# Patient Record
Sex: FEMALE | Race: WHITE | Employment: OTHER | URBAN - METROPOLITAN AREA
[De-identification: names, ages, dates, MRNs, and addresses within clinical notes are randomized per-mention and may not be internally consistent; named-entity substitution may affect disease eponyms.]

---

## 2017-01-01 ENCOUNTER — TELEPHONE (OUTPATIENT)
Dept: INTERNAL MEDICINE CLINIC | Facility: CLINIC | Age: 82
End: 2017-01-01

## 2017-01-01 ENCOUNTER — APPOINTMENT (OUTPATIENT)
Dept: GENERAL RADIOLOGY | Facility: HOSPITAL | Age: 82
DRG: 291 | End: 2017-01-01
Attending: EMERGENCY MEDICINE
Payer: MEDICARE

## 2017-01-01 ENCOUNTER — HOSPITAL ENCOUNTER (INPATIENT)
Facility: HOSPITAL | Age: 82
LOS: 3 days | Discharge: NURSING, REHAB OR LONG-TERM CARE | DRG: 640 | End: 2017-01-01
Attending: EMERGENCY MEDICINE | Admitting: HOSPITALIST
Payer: MEDICARE

## 2017-01-01 ENCOUNTER — OFFICE VISIT (OUTPATIENT)
Dept: INTERNAL MEDICINE CLINIC | Facility: CLINIC | Age: 82
End: 2017-01-01

## 2017-01-01 ENCOUNTER — SNF/IP PROF CHARGE ONLY (OUTPATIENT)
Dept: INTERNAL MEDICINE CLINIC | Facility: CLINIC | Age: 82
End: 2017-01-01

## 2017-01-01 ENCOUNTER — HOSPITAL ENCOUNTER (INPATIENT)
Facility: HOSPITAL | Age: 82
LOS: 1 days | DRG: 291 | End: 2017-01-01
Attending: EMERGENCY MEDICINE | Admitting: HOSPITALIST
Payer: MEDICARE

## 2017-01-01 ENCOUNTER — APPOINTMENT (OUTPATIENT)
Dept: GENERAL RADIOLOGY | Facility: HOSPITAL | Age: 82
DRG: 872 | End: 2017-01-01
Attending: EMERGENCY MEDICINE
Payer: MEDICARE

## 2017-01-01 ENCOUNTER — APPOINTMENT (OUTPATIENT)
Dept: GENERAL RADIOLOGY | Facility: HOSPITAL | Age: 82
DRG: 872 | End: 2017-01-01
Attending: HOSPITALIST
Payer: MEDICARE

## 2017-01-01 ENCOUNTER — HOSPITAL ENCOUNTER (INPATIENT)
Facility: HOSPITAL | Age: 82
LOS: 6 days | Discharge: NURSING, REHAB OR LONG-TERM CARE | DRG: 872 | End: 2017-01-01
Attending: EMERGENCY MEDICINE | Admitting: HOSPITALIST
Payer: MEDICARE

## 2017-01-01 ENCOUNTER — APPOINTMENT (OUTPATIENT)
Dept: PHYSICAL THERAPY | Facility: HOSPITAL | Age: 82
DRG: 872 | End: 2017-01-01
Attending: HOSPITALIST
Payer: MEDICARE

## 2017-01-01 ENCOUNTER — APPOINTMENT (OUTPATIENT)
Dept: GENERAL RADIOLOGY | Facility: HOSPITAL | Age: 82
DRG: 640 | End: 2017-01-01
Attending: EMERGENCY MEDICINE
Payer: MEDICARE

## 2017-01-01 ENCOUNTER — APPOINTMENT (OUTPATIENT)
Dept: GENERAL RADIOLOGY | Facility: HOSPITAL | Age: 82
DRG: 640 | End: 2017-01-01
Attending: NURSE PRACTITIONER
Payer: MEDICARE

## 2017-01-01 ENCOUNTER — SNF VISIT (OUTPATIENT)
Dept: INTERNAL MEDICINE CLINIC | Facility: CLINIC | Age: 82
End: 2017-01-01

## 2017-01-01 ENCOUNTER — LAB ENCOUNTER (OUTPATIENT)
Dept: LAB | Age: 82
End: 2017-01-01
Attending: INTERNAL MEDICINE
Payer: MEDICARE

## 2017-01-01 ENCOUNTER — SNF VISIT (OUTPATIENT)
Dept: INTERNAL MEDICINE CLINIC | Facility: SKILLED NURSING FACILITY | Age: 82
End: 2017-01-01

## 2017-01-01 ENCOUNTER — HOSPITAL ENCOUNTER (OUTPATIENT)
Dept: GENERAL RADIOLOGY | Age: 82
Discharge: HOME | End: 2017-01-01
Attending: INTERNAL MEDICINE | Admitting: INTERNAL MEDICINE
Payer: MEDICARE

## 2017-01-01 ENCOUNTER — APPOINTMENT (OUTPATIENT)
Dept: CV DIAGNOSTICS | Facility: HOSPITAL | Age: 82
DRG: 640 | End: 2017-01-01
Attending: INTERNAL MEDICINE
Payer: MEDICARE

## 2017-01-01 ENCOUNTER — APPOINTMENT (OUTPATIENT)
Dept: CT IMAGING | Facility: HOSPITAL | Age: 82
DRG: 872 | End: 2017-01-01
Attending: EMERGENCY MEDICINE
Payer: MEDICARE

## 2017-01-01 VITALS
OXYGEN SATURATION: 96 % | BODY MASS INDEX: 15 KG/M2 | SYSTOLIC BLOOD PRESSURE: 110 MMHG | DIASTOLIC BLOOD PRESSURE: 58 MMHG | HEIGHT: 59 IN | TEMPERATURE: 99 F | HEART RATE: 116 BPM | WEIGHT: 74.38 LBS | RESPIRATION RATE: 20 BRPM

## 2017-01-01 VITALS
DIASTOLIC BLOOD PRESSURE: 37 MMHG | BODY MASS INDEX: 16.92 KG/M2 | WEIGHT: 80.63 LBS | TEMPERATURE: 98 F | OXYGEN SATURATION: 90 % | HEART RATE: 62 BPM | HEIGHT: 58 IN | RESPIRATION RATE: 18 BRPM | SYSTOLIC BLOOD PRESSURE: 120 MMHG

## 2017-01-01 VITALS
BODY MASS INDEX: 15.59 KG/M2 | HEART RATE: 91 BPM | TEMPERATURE: 98 F | SYSTOLIC BLOOD PRESSURE: 100 MMHG | WEIGHT: 77.31 LBS | HEIGHT: 59 IN | OXYGEN SATURATION: 96 % | DIASTOLIC BLOOD PRESSURE: 44 MMHG | RESPIRATION RATE: 16 BRPM

## 2017-01-01 VITALS
TEMPERATURE: 98 F | DIASTOLIC BLOOD PRESSURE: 56 MMHG | HEART RATE: 58 BPM | BODY MASS INDEX: 14.84 KG/M2 | WEIGHT: 73.63 LBS | RESPIRATION RATE: 16 BRPM | SYSTOLIC BLOOD PRESSURE: 120 MMHG | HEIGHT: 59 IN | OXYGEN SATURATION: 97 %

## 2017-01-01 VITALS
HEIGHT: 59 IN | WEIGHT: 68.19 LBS | BODY MASS INDEX: 13.75 KG/M2 | OXYGEN SATURATION: 92 % | DIASTOLIC BLOOD PRESSURE: 40 MMHG | SYSTOLIC BLOOD PRESSURE: 110 MMHG | HEART RATE: 68 BPM | TEMPERATURE: 98 F

## 2017-01-01 VITALS
SYSTOLIC BLOOD PRESSURE: 118 MMHG | WEIGHT: 72 LBS | BODY MASS INDEX: 15 KG/M2 | HEART RATE: 68 BPM | DIASTOLIC BLOOD PRESSURE: 40 MMHG | TEMPERATURE: 98 F

## 2017-01-01 DIAGNOSIS — R63.4 WEIGHT LOSS: ICD-10-CM

## 2017-01-01 DIAGNOSIS — I48.0 PAROXYSMAL ATRIAL FIBRILLATION (HCC): ICD-10-CM

## 2017-01-01 DIAGNOSIS — R63.5 WEIGHT GAIN: ICD-10-CM

## 2017-01-01 DIAGNOSIS — G25.81 RESTLESS LEG SYNDROME: ICD-10-CM

## 2017-01-01 DIAGNOSIS — D64.9 ANEMIA, UNSPECIFIED: Primary | ICD-10-CM

## 2017-01-01 DIAGNOSIS — R77.8 ELEVATED TROPONIN: ICD-10-CM

## 2017-01-01 DIAGNOSIS — I35.9 AORTIC VALVE DISORDER: ICD-10-CM

## 2017-01-01 DIAGNOSIS — R53.1 WEAKNESS: ICD-10-CM

## 2017-01-01 DIAGNOSIS — R82.90 ABNORMAL URINALYSIS: ICD-10-CM

## 2017-01-01 DIAGNOSIS — R82.90 NONSPECIFIC FINDING ON EXAMINATION OF URINE: Primary | ICD-10-CM

## 2017-01-01 DIAGNOSIS — J44.9 CHRONIC OBSTRUCTIVE PULMONARY DISEASE, UNSPECIFIED COPD TYPE (HCC): ICD-10-CM

## 2017-01-01 DIAGNOSIS — Z95.2 S/P TAVR (TRANSCATHETER AORTIC VALVE REPLACEMENT): ICD-10-CM

## 2017-01-01 DIAGNOSIS — I10 ESSENTIAL HYPERTENSION: Primary | ICD-10-CM

## 2017-01-01 DIAGNOSIS — R53.1 WEAKNESS GENERALIZED: ICD-10-CM

## 2017-01-01 DIAGNOSIS — E86.0 DEHYDRATION: ICD-10-CM

## 2017-01-01 DIAGNOSIS — Z85.3 HISTORY OF LEFT BREAST CANCER: ICD-10-CM

## 2017-01-01 DIAGNOSIS — I50.32 CHRONIC DIASTOLIC CONGESTIVE HEART FAILURE (HCC): ICD-10-CM

## 2017-01-01 DIAGNOSIS — R06.02 SOB (SHORTNESS OF BREATH): ICD-10-CM

## 2017-01-01 DIAGNOSIS — Z91.81 AT RISK FOR FALLING: ICD-10-CM

## 2017-01-01 DIAGNOSIS — N39.0 URINARY TRACT INFECTION WITH HEMATURIA, SITE UNSPECIFIED: Primary | ICD-10-CM

## 2017-01-01 DIAGNOSIS — R60.0 BILATERAL EDEMA OF LOWER EXTREMITY: ICD-10-CM

## 2017-01-01 DIAGNOSIS — I48.0 PAROXYSMAL ATRIAL FIBRILLATION (HCC): Primary | ICD-10-CM

## 2017-01-01 DIAGNOSIS — I95.9 HYPOTENSION, UNSPECIFIED HYPOTENSION TYPE: ICD-10-CM

## 2017-01-01 DIAGNOSIS — R39.9 LOWER URINARY TRACT SYMPTOMS: ICD-10-CM

## 2017-01-01 DIAGNOSIS — E86.0 DEHYDRATION: Primary | ICD-10-CM

## 2017-01-01 DIAGNOSIS — R31.9 URINARY TRACT INFECTION WITH HEMATURIA, SITE UNSPECIFIED: Primary | ICD-10-CM

## 2017-01-01 DIAGNOSIS — I48.20 CHRONIC ATRIAL FIBRILLATION (HCC): ICD-10-CM

## 2017-01-01 DIAGNOSIS — R10.9 ABDOMINAL PAIN, UNSPECIFIED ABDOMINAL LOCATION: ICD-10-CM

## 2017-01-01 DIAGNOSIS — I25.119 ATHEROSCLEROSIS OF NATIVE CORONARY ARTERY OF NATIVE HEART WITH ANGINA PECTORIS (HCC): ICD-10-CM

## 2017-01-01 DIAGNOSIS — D64.9 ANEMIA, UNSPECIFIED: ICD-10-CM

## 2017-01-01 DIAGNOSIS — N13.30 HYDRONEPHROSIS, UNSPECIFIED HYDRONEPHROSIS TYPE: ICD-10-CM

## 2017-01-01 DIAGNOSIS — R10.84 GENERALIZED ABDOMINAL PAIN: ICD-10-CM

## 2017-01-01 DIAGNOSIS — R55 SYNCOPE AND COLLAPSE: Primary | ICD-10-CM

## 2017-01-01 DIAGNOSIS — D64.9 ANEMIA, UNSPECIFIED TYPE: ICD-10-CM

## 2017-01-01 DIAGNOSIS — R06.02 SHORTNESS OF BREATH: ICD-10-CM

## 2017-01-01 DIAGNOSIS — R19.5 HEME POSITIVE STOOL: ICD-10-CM

## 2017-01-01 DIAGNOSIS — R10.10 PAIN OF UPPER ABDOMEN: ICD-10-CM

## 2017-01-01 DIAGNOSIS — I25.10 CORONARY ARTERY DISEASE INVOLVING NATIVE CORONARY ARTERY OF NATIVE HEART WITHOUT ANGINA PECTORIS: ICD-10-CM

## 2017-01-01 DIAGNOSIS — R00.0 TACHYCARDIA WITH HEART RATE 141-160 BEATS PER MINUTE: ICD-10-CM

## 2017-01-01 DIAGNOSIS — Z85.3 HISTORY OF BREAST CANCER: ICD-10-CM

## 2017-01-01 DIAGNOSIS — I50.9 CHRONIC CONGESTIVE HEART FAILURE, UNSPECIFIED CONGESTIVE HEART FAILURE TYPE: ICD-10-CM

## 2017-01-01 DIAGNOSIS — I50.9 ACUTE ON CHRONIC CONGESTIVE HEART FAILURE, UNSPECIFIED CONGESTIVE HEART FAILURE TYPE: ICD-10-CM

## 2017-01-01 DIAGNOSIS — R53.1 GENERALIZED WEAKNESS: ICD-10-CM

## 2017-01-01 DIAGNOSIS — I10 ESSENTIAL HYPERTENSION: ICD-10-CM

## 2017-01-01 DIAGNOSIS — Z95.2 HEART VALVE REPLACED: ICD-10-CM

## 2017-01-01 DIAGNOSIS — I48.91 ATRIAL FIBRILLATION WITH RVR (HCC): ICD-10-CM

## 2017-01-01 DIAGNOSIS — R53.1 GENERAL WEAKNESS: ICD-10-CM

## 2017-01-01 DIAGNOSIS — R10.9 ABDOMINAL PAIN, UNSPECIFIED ABDOMINAL LOCATION: Primary | ICD-10-CM

## 2017-01-01 DIAGNOSIS — K59.00 CONSTIPATION, UNSPECIFIED CONSTIPATION TYPE: ICD-10-CM

## 2017-01-01 LAB
ALBUMIN SERPL BCP-MCNC: 2.7 G/DL (ref 3.5–4.8)
ALBUMIN SERPL BCP-MCNC: 3.4 G/DL (ref 3.5–4.8)
ALBUMIN/GLOB SERPL: 1 {RATIO} (ref 1–2)
ALP SERPL-CCNC: 37 U/L (ref 32–100)
ALP SERPL-CCNC: 65 U/L (ref 32–100)
ALT SERPL-CCNC: 7 U/L (ref 14–54)
ALT SERPL-CCNC: <5 U/L (ref 14–54)
ANION GAP SERPL CALC-SCNC: 10 MMOL/L (ref 0–18)
ANION GAP SERPL CALC-SCNC: 15 MMOL/L (ref 0–18)
ANION GAP SERPL CALC-SCNC: 3 MMOL/L (ref 0–18)
ANION GAP SERPL CALC-SCNC: 5 MMOL/L (ref 0–18)
ANION GAP SERPL CALC-SCNC: 5 MMOL/L (ref 0–18)
ANION GAP SERPL CALC-SCNC: 7 MMOL/L (ref 0–18)
ANION GAP SERPL CALC-SCNC: 8 MMOL/L (ref 0–18)
ANTIBODY SCREEN: NEGATIVE
APTT PPP: 29.6 SECONDS (ref 23.2–35.3)
AST SERPL-CCNC: 19 U/L (ref 15–41)
AST SERPL-CCNC: 24 U/L (ref 15–41)
BACTERIA UR QL AUTO: NEGATIVE /HPF
BASOPHILS # BLD: 0 K/UL (ref 0–0.2)
BASOPHILS # BLD: 0.1 K/UL (ref 0–0.2)
BASOPHILS NFR BLD: 0 %
BASOPHILS NFR BLD: 1 %
BILIRUB DIRECT SERPL-MCNC: 0.1 MG/DL (ref 0–0.2)
BILIRUB SERPL-MCNC: 0.6 MG/DL (ref 0.3–1.2)
BILIRUB SERPL-MCNC: 0.6 MG/DL (ref 0.3–1.2)
BILIRUB UR QL: NEGATIVE
BNP SERPL-MCNC: 539 PG/ML (ref 0–100)
BUN SERPL-MCNC: 14 MG/DL (ref 8–20)
BUN SERPL-MCNC: 18 MG/DL (ref 8–20)
BUN SERPL-MCNC: 20 MG/DL (ref 8–20)
BUN SERPL-MCNC: 22 MG/DL (ref 8–20)
BUN SERPL-MCNC: 27 MG/DL (ref 8–20)
BUN SERPL-MCNC: 30 MG/DL (ref 8–20)
BUN SERPL-MCNC: 31 MG/DL (ref 8–20)
BUN SERPL-MCNC: 36 MG/DL (ref 8–20)
BUN SERPL-MCNC: 37 MG/DL (ref 8–20)
BUN/CREAT SERPL: 18.7 (ref 10–20)
BUN/CREAT SERPL: 21.4 (ref 10–20)
BUN/CREAT SERPL: 27.9 (ref 10–20)
BUN/CREAT SERPL: 29.4 (ref 10–20)
BUN/CREAT SERPL: 31.6 (ref 10–20)
BUN/CREAT SERPL: 32.4 (ref 10–20)
BUN/CREAT SERPL: 33.3 (ref 10–20)
BUN/CREAT SERPL: 33.7 (ref 10–20)
BUN/CREAT SERPL: 40.8 (ref 10–20)
CALCIUM SERPL-MCNC: 7.8 MG/DL (ref 8.5–10.5)
CALCIUM SERPL-MCNC: 7.9 MG/DL (ref 8.5–10.5)
CALCIUM SERPL-MCNC: 8.1 MG/DL (ref 8.5–10.5)
CALCIUM SERPL-MCNC: 8.1 MG/DL (ref 8.5–10.5)
CALCIUM SERPL-MCNC: 8.2 MG/DL (ref 8.5–10.5)
CALCIUM SERPL-MCNC: 8.6 MG/DL (ref 8.5–10.5)
CALCIUM SERPL-MCNC: 8.7 MG/DL (ref 8.5–10.5)
CALCIUM SERPL-MCNC: 8.8 MG/DL (ref 8.5–10.5)
CALCIUM SERPL-MCNC: 9.1 MG/DL (ref 8.5–10.5)
CHLORIDE SERPL-SCNC: 101 MMOL/L (ref 95–110)
CHLORIDE SERPL-SCNC: 102 MMOL/L (ref 95–110)
CHLORIDE SERPL-SCNC: 102 MMOL/L (ref 95–110)
CHLORIDE SERPL-SCNC: 103 MMOL/L (ref 95–110)
CHLORIDE SERPL-SCNC: 104 MMOL/L (ref 95–110)
CHLORIDE SERPL-SCNC: 109 MMOL/L (ref 95–110)
CHLORIDE SERPL-SCNC: 109 MMOL/L (ref 95–110)
CHLORIDE SERPL-SCNC: 112 MMOL/L (ref 95–110)
CHLORIDE SERPL-SCNC: 116 MMOL/L (ref 95–110)
CHOLEST SERPL-MCNC: 137 MG/DL (ref 110–200)
CLARITY UR: CLEAR
CLARITY UR: CLEAR
CO2 SERPL-SCNC: 21 MMOL/L (ref 22–32)
CO2 SERPL-SCNC: 23 MMOL/L (ref 22–32)
CO2 SERPL-SCNC: 23 MMOL/L (ref 22–32)
CO2 SERPL-SCNC: 24 MMOL/L (ref 22–32)
CO2 SERPL-SCNC: 26 MMOL/L (ref 22–32)
CO2 SERPL-SCNC: 26 MMOL/L (ref 22–32)
CO2 SERPL-SCNC: 28 MMOL/L (ref 22–32)
COLOR UR: YELLOW
CREAT SERPL-MCNC: 0.68 MG/DL (ref 0.5–1.5)
CREAT SERPL-MCNC: 0.68 MG/DL (ref 0.5–1.5)
CREAT SERPL-MCNC: 0.75 MG/DL (ref 0.5–1.5)
CREAT SERPL-MCNC: 0.76 MG/DL (ref 0.5–1.5)
CREAT SERPL-MCNC: 0.81 MG/DL (ref 0.5–1.5)
CREAT SERPL-MCNC: 0.84 MG/DL (ref 0.5–1.5)
CREAT SERPL-MCNC: 0.89 MG/DL (ref 0.5–1.5)
CREAT SERPL-MCNC: 1.17 MG/DL (ref 0.5–1.5)
CREAT SERPL-MCNC: 1.29 MG/DL (ref 0.5–1.5)
EOSINOPHIL # BLD: 0 K/UL (ref 0–0.7)
EOSINOPHIL # BLD: 0.1 K/UL (ref 0–0.7)
EOSINOPHIL # BLD: 0.1 K/UL (ref 0–0.7)
EOSINOPHIL # BLD: 0.2 K/UL (ref 0–0.7)
EOSINOPHIL # BLD: 0.4 K/UL (ref 0–0.7)
EOSINOPHIL # BLD: 0.4 K/UL (ref 0–0.7)
EOSINOPHIL NFR BLD: 0 %
EOSINOPHIL NFR BLD: 1 %
EOSINOPHIL NFR BLD: 1 %
EOSINOPHIL NFR BLD: 2 %
EOSINOPHIL NFR BLD: 2 %
EOSINOPHIL NFR BLD: 3 %
EOSINOPHIL NFR BLD: 4 %
ERYTHROCYTE [DISTWIDTH] IN BLOOD BY AUTOMATED COUNT: 15.6 % (ref 11–15)
ERYTHROCYTE [DISTWIDTH] IN BLOOD BY AUTOMATED COUNT: 17.7 % (ref 11–15)
ERYTHROCYTE [DISTWIDTH] IN BLOOD BY AUTOMATED COUNT: 17.7 % (ref 11–15)
ERYTHROCYTE [DISTWIDTH] IN BLOOD BY AUTOMATED COUNT: 17.8 % (ref 11–15)
ERYTHROCYTE [DISTWIDTH] IN BLOOD BY AUTOMATED COUNT: 17.8 % (ref 11–15)
ERYTHROCYTE [DISTWIDTH] IN BLOOD BY AUTOMATED COUNT: 18 % (ref 11–15)
ERYTHROCYTE [DISTWIDTH] IN BLOOD BY AUTOMATED COUNT: 18.1 % (ref 11–15)
ERYTHROCYTE [DISTWIDTH] IN BLOOD BY AUTOMATED COUNT: 18.6 % (ref 11–15)
ERYTHROCYTE [DISTWIDTH] IN BLOOD BY AUTOMATED COUNT: 18.9 % (ref 11–15)
FERRITIN SERPL IA-MCNC: 505 NG/ML (ref 11–307)
GLOBULIN PLAS-MCNC: 3.3 G/DL (ref 2.5–3.7)
GLUCOSE BLDC GLUCOMTR-MCNC: 131 MG/DL (ref 70–99)
GLUCOSE SERPL-MCNC: 101 MG/DL (ref 70–99)
GLUCOSE SERPL-MCNC: 112 MG/DL (ref 70–99)
GLUCOSE SERPL-MCNC: 112 MG/DL (ref 70–99)
GLUCOSE SERPL-MCNC: 137 MG/DL (ref 70–99)
GLUCOSE SERPL-MCNC: 157 MG/DL (ref 70–99)
GLUCOSE SERPL-MCNC: 73 MG/DL (ref 70–99)
GLUCOSE SERPL-MCNC: 91 MG/DL (ref 70–99)
GLUCOSE SERPL-MCNC: 94 MG/DL (ref 70–99)
GLUCOSE SERPL-MCNC: 97 MG/DL (ref 70–99)
GLUCOSE UR-MCNC: NEGATIVE MG/DL
HCT VFR BLD AUTO: 23.6 % (ref 35–48)
HCT VFR BLD AUTO: 23.6 % (ref 35–48)
HCT VFR BLD AUTO: 24.5 % (ref 35–48)
HCT VFR BLD AUTO: 25.5 % (ref 35–48)
HCT VFR BLD AUTO: 26.3 % (ref 35–48)
HCT VFR BLD AUTO: 28.9 % (ref 35–48)
HCT VFR BLD AUTO: 32.1 % (ref 35–48)
HCT VFR BLD AUTO: 32.9 % (ref 35–48)
HCT VFR BLD AUTO: 33.3 % (ref 35–48)
HDLC SERPL-MCNC: 52 MG/DL
HGB BLD-MCNC: 10.5 G/DL (ref 12–16)
HGB BLD-MCNC: 10.8 G/DL (ref 12–16)
HGB BLD-MCNC: 11 G/DL (ref 12–16)
HGB BLD-MCNC: 7.7 G/DL (ref 12–16)
HGB BLD-MCNC: 7.8 G/DL (ref 12–16)
HGB BLD-MCNC: 8.1 G/DL (ref 12–16)
HGB BLD-MCNC: 8.3 G/DL (ref 12–16)
HGB BLD-MCNC: 8.7 G/DL (ref 12–16)
HGB BLD-MCNC: 9.3 G/DL (ref 12–16)
HGB UR QL STRIP.AUTO: NEGATIVE
HGB UR QL STRIP.AUTO: NEGATIVE
HYALINE CASTS #/AREA URNS AUTO: 1 /LPF
INR BLD: 1.4 (ref 0.9–1.2)
IRON SATN MFR SERPL: 15 % (ref 15–50)
IRON SATN MFR SERPL: 6 % (ref 15–50)
IRON SERPL-MCNC: 11 MCG/DL (ref 28–170)
IRON SERPL-MCNC: 36 MCG/DL (ref 28–170)
KETONES UR-MCNC: NEGATIVE MG/DL
KETONES UR-MCNC: NEGATIVE MG/DL
LACTATE SERPL-SCNC: 1.1 MMOL/L (ref 0.5–2.2)
LACTATE SERPL-SCNC: 1.5 MMOL/L (ref 0.5–2.2)
LACTATE SERPL-SCNC: 1.9 MMOL/L (ref 0.5–2.2)
LACTATE SERPL-SCNC: 2.2 MMOL/L (ref 0.5–2.2)
LACTATE SERPL-SCNC: 2.5 MMOL/L (ref 0.5–2.2)
LDLC SERPL CALC-MCNC: 73 MG/DL (ref 0–99)
LEUKOCYTE ESTERASE UR QL STRIP.AUTO: NEGATIVE
LEUKOCYTE ESTERASE UR QL STRIP.AUTO: NEGATIVE
LYMPHOCYTES # BLD: 0.7 K/UL (ref 1–4)
LYMPHOCYTES # BLD: 0.8 K/UL (ref 1–4)
LYMPHOCYTES # BLD: 0.9 K/UL (ref 1–4)
LYMPHOCYTES # BLD: 0.9 K/UL (ref 1–4)
LYMPHOCYTES # BLD: 1 K/UL (ref 1–4)
LYMPHOCYTES # BLD: 1.1 K/UL (ref 1–4)
LYMPHOCYTES # BLD: 1.5 K/UL (ref 1–4)
LYMPHOCYTES # BLD: 1.5 K/UL (ref 1–4)
LYMPHOCYTES # BLD: 2.6 K/UL (ref 1–4)
LYMPHOCYTES NFR BLD: 11 %
LYMPHOCYTES NFR BLD: 11 %
LYMPHOCYTES NFR BLD: 19 %
LYMPHOCYTES NFR BLD: 21 %
LYMPHOCYTES NFR BLD: 3 %
LYMPHOCYTES NFR BLD: 4 %
LYMPHOCYTES NFR BLD: 42 %
LYMPHOCYTES NFR BLD: 6 %
LYMPHOCYTES NFR BLD: 9 %
MAGNESIUM SERPL-MCNC: 1.8 MG/DL (ref 1.8–2.5)
MAGNESIUM SERPL-MCNC: 1.9 MG/DL (ref 1.8–2.5)
MAGNESIUM SERPL-MCNC: 2.1 MG/DL (ref 1.8–2.5)
MCH RBC QN AUTO: 25.9 PG (ref 27–32)
MCH RBC QN AUTO: 26 PG (ref 27–32)
MCH RBC QN AUTO: 26.2 PG (ref 27–32)
MCH RBC QN AUTO: 26.2 PG (ref 27–32)
MCH RBC QN AUTO: 26.5 PG (ref 27–32)
MCH RBC QN AUTO: 26.6 PG (ref 27–32)
MCH RBC QN AUTO: 26.7 PG (ref 27–32)
MCH RBC QN AUTO: 27.7 PG (ref 27–32)
MCH RBC QN AUTO: 28.6 PG (ref 27–32)
MCHC RBC AUTO-ENTMCNC: 32.3 G/DL (ref 32–37)
MCHC RBC AUTO-ENTMCNC: 32.5 G/DL (ref 32–37)
MCHC RBC AUTO-ENTMCNC: 32.6 G/DL (ref 32–37)
MCHC RBC AUTO-ENTMCNC: 32.7 G/DL (ref 32–37)
MCHC RBC AUTO-ENTMCNC: 32.9 G/DL (ref 32–37)
MCHC RBC AUTO-ENTMCNC: 33.1 G/DL (ref 32–37)
MCHC RBC AUTO-ENTMCNC: 33.1 G/DL (ref 32–37)
MCHC RBC AUTO-ENTMCNC: 33.3 G/DL (ref 32–37)
MCHC RBC AUTO-ENTMCNC: 33.3 G/DL (ref 32–37)
MCV RBC AUTO: 79.6 FL (ref 80–100)
MCV RBC AUTO: 80.1 FL (ref 80–100)
MCV RBC AUTO: 80.2 FL (ref 80–100)
MCV RBC AUTO: 80.2 FL (ref 80–100)
MCV RBC AUTO: 80.3 FL (ref 80–100)
MCV RBC AUTO: 80.4 FL (ref 80–100)
MCV RBC AUTO: 80.5 FL (ref 80–100)
MCV RBC AUTO: 84.4 FL (ref 80–100)
MCV RBC AUTO: 86.5 FL (ref 80–100)
MONOCYTES # BLD: 0.3 K/UL (ref 0–1)
MONOCYTES # BLD: 0.7 K/UL (ref 0–1)
MONOCYTES # BLD: 0.9 K/UL (ref 0–1)
MONOCYTES # BLD: 0.9 K/UL (ref 0–1)
MONOCYTES # BLD: 1 K/UL (ref 0–1)
MONOCYTES # BLD: 1.1 K/UL (ref 0–1)
MONOCYTES # BLD: 1.4 K/UL (ref 0–1)
MONOCYTES NFR BLD: 10 %
MONOCYTES NFR BLD: 11 %
MONOCYTES NFR BLD: 11 %
MONOCYTES NFR BLD: 12 %
MONOCYTES NFR BLD: 14 %
MONOCYTES NFR BLD: 5 %
MONOCYTES NFR BLD: 5 %
MONOCYTES NFR BLD: 6 %
MONOCYTES NFR BLD: 7 %
NEUTROPHILS # BLD AUTO: 13.5 K/UL (ref 1.8–7.7)
NEUTROPHILS # BLD AUTO: 16.8 K/UL (ref 1.8–7.7)
NEUTROPHILS # BLD AUTO: 20.2 K/UL (ref 1.8–7.7)
NEUTROPHILS # BLD AUTO: 3.1 K/UL (ref 1.8–7.7)
NEUTROPHILS # BLD AUTO: 4.8 K/UL (ref 1.8–7.7)
NEUTROPHILS # BLD AUTO: 5.1 K/UL (ref 1.8–7.7)
NEUTROPHILS # BLD AUTO: 7 K/UL (ref 1.8–7.7)
NEUTROPHILS # BLD AUTO: 7.6 K/UL (ref 1.8–7.7)
NEUTROPHILS # BLD AUTO: 7.7 K/UL (ref 1.8–7.7)
NEUTROPHILS NFR BLD: 50 %
NEUTROPHILS NFR BLD: 67 %
NEUTROPHILS NFR BLD: 68 %
NEUTROPHILS NFR BLD: 72 %
NEUTROPHILS NFR BLD: 73 %
NEUTROPHILS NFR BLD: 78 %
NEUTROPHILS NFR BLD: 87 %
NEUTROPHILS NFR BLD: 90 %
NEUTROPHILS NFR BLD: 92 %
NITRITE UR QL STRIP.AUTO: NEGATIVE
NONHDLC SERPL-MCNC: 85 MG/DL
OSMOLALITY UR CALC.SUM OF ELEC: 274 MOSM/KG (ref 275–295)
OSMOLALITY UR CALC.SUM OF ELEC: 282 MOSM/KG (ref 275–295)
OSMOLALITY UR CALC.SUM OF ELEC: 287 MOSM/KG (ref 275–295)
OSMOLALITY UR CALC.SUM OF ELEC: 290 MOSM/KG (ref 275–295)
OSMOLALITY UR CALC.SUM OF ELEC: 293 MOSM/KG (ref 275–295)
OSMOLALITY UR CALC.SUM OF ELEC: 294 MOSM/KG (ref 275–295)
OSMOLALITY UR CALC.SUM OF ELEC: 296 MOSM/KG (ref 275–295)
OSMOLALITY UR CALC.SUM OF ELEC: 296 MOSM/KG (ref 275–295)
OSMOLALITY UR CALC.SUM OF ELEC: 302 MOSM/KG (ref 275–295)
PH UR: 5 [PH] (ref 5–8)
PH UR: 6 [PH] (ref 5–8)
PH UR: 6 [PH] (ref 5–8)
PLATELET # BLD AUTO: 151 K/UL (ref 140–400)
PLATELET # BLD AUTO: 167 K/UL (ref 140–400)
PLATELET # BLD AUTO: 180 K/UL (ref 140–400)
PLATELET # BLD AUTO: 185 K/UL (ref 140–400)
PLATELET # BLD AUTO: 203 K/UL (ref 140–400)
PLATELET # BLD AUTO: 210 K/UL (ref 140–400)
PLATELET # BLD AUTO: 211 K/UL (ref 140–400)
PLATELET # BLD AUTO: 225 K/UL (ref 140–400)
PLATELET # BLD AUTO: 263 K/UL (ref 140–400)
PMV BLD AUTO: 8 FL (ref 7.4–10.3)
PMV BLD AUTO: 8.1 FL (ref 7.4–10.3)
PMV BLD AUTO: 8.2 FL (ref 7.4–10.3)
PMV BLD AUTO: 8.2 FL (ref 7.4–10.3)
PMV BLD AUTO: 8.3 FL (ref 7.4–10.3)
PMV BLD AUTO: 8.4 FL (ref 7.4–10.3)
PMV BLD AUTO: 8.5 FL (ref 7.4–10.3)
PMV BLD AUTO: 8.5 FL (ref 7.4–10.3)
PMV BLD AUTO: 9.1 FL (ref 7.4–10.3)
POTASSIUM SERPL-SCNC: 3.4 MMOL/L (ref 3.3–5.1)
POTASSIUM SERPL-SCNC: 3.5 MMOL/L (ref 3.3–5.1)
POTASSIUM SERPL-SCNC: 3.8 MMOL/L (ref 3.3–5.1)
POTASSIUM SERPL-SCNC: 3.9 MMOL/L (ref 3.3–5.1)
POTASSIUM SERPL-SCNC: 4 MMOL/L (ref 3.3–5.1)
POTASSIUM SERPL-SCNC: 4 MMOL/L (ref 3.3–5.1)
POTASSIUM SERPL-SCNC: 4.1 MMOL/L (ref 3.3–5.1)
POTASSIUM SERPL-SCNC: 4.3 MMOL/L (ref 3.3–5.1)
POTASSIUM SERPL-SCNC: 4.7 MMOL/L (ref 3.3–5.1)
POTASSIUM SERPL-SCNC: 5.1 MMOL/L (ref 3.3–5.1)
PROCALCITONIN SERPL-MCNC: 0.07 NG/ML (ref ?–0.11)
PROT SERPL-MCNC: 5.5 G/DL (ref 5.9–8.4)
PROT SERPL-MCNC: 6.7 G/DL (ref 5.9–8.4)
PROT UR-MCNC: NEGATIVE MG/DL
PROTHROMBIN TIME: 17.4 SECONDS (ref 11.8–14.5)
RBC # BLD AUTO: 2.94 M/UL (ref 3.7–5.4)
RBC # BLD AUTO: 2.94 M/UL (ref 3.7–5.4)
RBC # BLD AUTO: 3.07 M/UL (ref 3.7–5.4)
RBC # BLD AUTO: 3.18 M/UL (ref 3.7–5.4)
RBC # BLD AUTO: 3.27 M/UL (ref 3.7–5.4)
RBC # BLD AUTO: 3.6 M/UL (ref 3.7–5.4)
RBC # BLD AUTO: 3.8 M/UL (ref 3.7–5.4)
RBC # BLD AUTO: 3.85 M/UL (ref 3.7–5.4)
RBC # BLD AUTO: 4.1 M/UL (ref 3.7–5.4)
RBC #/AREA URNS AUTO: 1 /HPF
RBC #/AREA URNS AUTO: <1 /HPF
RH BLOOD TYPE: POSITIVE
SODIUM SERPL-SCNC: 132 MMOL/L (ref 136–144)
SODIUM SERPL-SCNC: 135 MMOL/L (ref 136–144)
SODIUM SERPL-SCNC: 137 MMOL/L (ref 136–144)
SODIUM SERPL-SCNC: 137 MMOL/L (ref 136–144)
SODIUM SERPL-SCNC: 138 MMOL/L (ref 136–144)
SODIUM SERPL-SCNC: 139 MMOL/L (ref 136–144)
SODIUM SERPL-SCNC: 140 MMOL/L (ref 136–144)
SP GR UR STRIP: 1.01 (ref 1–1.03)
SP GR UR STRIP: 1.02 (ref 1–1.03)
SP GR UR STRIP: 1.02 (ref 1–1.03)
T4 FREE SERPL-MCNC: 1.11 NG/DL (ref 0.58–1.64)
TIBC SERPL-MCNC: 197 MCG/DL (ref 228–428)
TIBC SERPL-MCNC: 242 MCG/DL (ref 228–428)
TRANSFERRIN SERPL-MCNC: 149 MG/DL (ref 192–382)
TRANSFERRIN SERPL-MCNC: 183 MG/DL (ref 192–382)
TRIGL SERPL-MCNC: 59 MG/DL (ref 1–149)
TROPONIN I SERPL-MCNC: 0.04 NG/ML (ref ?–0.03)
TROPONIN I SERPL-MCNC: 0.05 NG/ML (ref ?–0.03)
TSH SERPL-ACNC: 2.48 UIU/ML (ref 0.34–5.6)
UROBILINOGEN UR STRIP-ACNC: <2
VIT C UR-MCNC: 40 MG/DL
VIT C UR-MCNC: NEGATIVE MG/DL
VIT C UR-MCNC: NEGATIVE MG/DL
WBC # BLD AUTO: 10.5 K/UL (ref 4–11)
WBC # BLD AUTO: 15.5 K/UL (ref 4–11)
WBC # BLD AUTO: 18.6 K/UL (ref 4–11)
WBC # BLD AUTO: 21.9 K/UL (ref 4–11)
WBC # BLD AUTO: 6 K/UL (ref 4–11)
WBC # BLD AUTO: 7.2 K/UL (ref 4–11)
WBC # BLD AUTO: 7.5 K/UL (ref 4–11)
WBC # BLD AUTO: 9.6 K/UL (ref 4–11)
WBC # BLD AUTO: 9.8 K/UL (ref 4–11)
WBC #/AREA URNS AUTO: 18 /HPF
WBC #/AREA URNS AUTO: 2 /HPF

## 2017-01-01 PROCEDURE — G0463 HOSPITAL OUTPT CLINIC VISIT: HCPCS | Performed by: INTERNAL MEDICINE

## 2017-01-01 PROCEDURE — 99214 OFFICE O/P EST MOD 30 MIN: CPT | Performed by: INTERNAL MEDICINE

## 2017-01-01 PROCEDURE — 71010 XR CHEST AP PORTABLE  (CPT=71010): CPT

## 2017-01-01 PROCEDURE — 99239 HOSP IP/OBS DSCHRG MGMT >30: CPT | Performed by: HOSPITALIST

## 2017-01-01 PROCEDURE — 99215 OFFICE O/P EST HI 40 MIN: CPT | Performed by: INTERNAL MEDICINE

## 2017-01-01 PROCEDURE — 93306 TTE W/DOPPLER COMPLETE: CPT | Performed by: INTERNAL MEDICINE

## 2017-01-01 PROCEDURE — 99223 1ST HOSP IP/OBS HIGH 75: CPT | Performed by: HOSPITALIST

## 2017-01-01 PROCEDURE — 36415 COLL VENOUS BLD VENIPUNCTURE: CPT

## 2017-01-01 PROCEDURE — 99310 SBSQ NF CARE HIGH MDM 45: CPT | Performed by: CLINICAL NURSE SPECIALIST

## 2017-01-01 PROCEDURE — 84466 ASSAY OF TRANSFERRIN: CPT

## 2017-01-01 PROCEDURE — 84439 ASSAY OF FREE THYROXINE: CPT

## 2017-01-01 PROCEDURE — 99308 SBSQ NF CARE LOW MDM 20: CPT | Performed by: INTERNAL MEDICINE

## 2017-01-01 PROCEDURE — 71020 XR CHEST PA + LAT CHEST (CPT=71020): CPT | Performed by: NURSE PRACTITIONER

## 2017-01-01 PROCEDURE — 99233 SBSQ HOSP IP/OBS HIGH 50: CPT | Performed by: HOSPITALIST

## 2017-01-01 PROCEDURE — 71010 XR CHEST AP PORTABLE  (CPT=71010): CPT | Performed by: EMERGENCY MEDICINE

## 2017-01-01 PROCEDURE — 82728 ASSAY OF FERRITIN: CPT

## 2017-01-01 PROCEDURE — 80053 COMPREHEN METABOLIC PANEL: CPT

## 2017-01-01 PROCEDURE — 85025 COMPLETE CBC W/AUTO DIFF WBC: CPT

## 2017-01-01 PROCEDURE — 74176 CT ABD & PELVIS W/O CONTRAST: CPT

## 2017-01-01 PROCEDURE — 70450 CT HEAD/BRAIN W/O DYE: CPT

## 2017-01-01 PROCEDURE — 99231 SBSQ HOSP IP/OBS SF/LOW 25: CPT | Performed by: REGISTERED NURSE

## 2017-01-01 PROCEDURE — 99306 1ST NF CARE HIGH MDM 50: CPT | Performed by: INTERNAL MEDICINE

## 2017-01-01 PROCEDURE — 3E0F76Z INTRODUCTION OF NUTRITIONAL SUBSTANCE INTO RESPIRATORY TRACT, VIA NATURAL OR ARTIFICIAL OPENING: ICD-10-PCS | Performed by: HOSPITALIST

## 2017-01-01 PROCEDURE — 87086 URINE CULTURE/COLONY COUNT: CPT

## 2017-01-01 PROCEDURE — 84443 ASSAY THYROID STIM HORMONE: CPT

## 2017-01-01 PROCEDURE — 81001 URINALYSIS AUTO W/SCOPE: CPT

## 2017-01-01 PROCEDURE — 99309 SBSQ NF CARE MODERATE MDM 30: CPT | Performed by: NURSE PRACTITIONER

## 2017-01-01 PROCEDURE — 74020 XR ABDOMEN, OBSTRUCTIVE SERIES (CPT=74020): CPT | Performed by: INTERNAL MEDICINE

## 2017-01-01 PROCEDURE — 80048 BASIC METABOLIC PNL TOTAL CA: CPT

## 2017-01-01 PROCEDURE — 83540 ASSAY OF IRON: CPT

## 2017-01-01 RX ORDER — IPRATROPIUM BROMIDE AND ALBUTEROL SULFATE 2.5; .5 MG/3ML; MG/3ML
3 SOLUTION RESPIRATORY (INHALATION)
Status: DISCONTINUED | OUTPATIENT
Start: 2017-01-01 | End: 2017-01-01

## 2017-01-01 RX ORDER — MAGNESIUM OXIDE 400 MG (241.3 MG MAGNESIUM) TABLET
400 TABLET ONCE
Status: COMPLETED | OUTPATIENT
Start: 2017-01-01 | End: 2017-01-01

## 2017-01-01 RX ORDER — ASPIRIN 81 MG/1
81 TABLET, CHEWABLE ORAL DAILY
Status: DISCONTINUED | OUTPATIENT
Start: 2017-01-01 | End: 2017-01-01

## 2017-01-01 RX ORDER — POLYETHYLENE GLYCOL 3350 17 G/17G
17 POWDER, FOR SOLUTION ORAL DAILY PRN
Qty: 7 EACH | Refills: 0 | Status: SHIPPED | OUTPATIENT
Start: 2017-01-01 | End: 2017-01-01

## 2017-01-01 RX ORDER — 0.9 % SODIUM CHLORIDE 0.9 %
VIAL (ML) INJECTION
Status: COMPLETED
Start: 2017-01-01 | End: 2017-01-01

## 2017-01-01 RX ORDER — FLUTICASONE PROPIONATE 50 MCG
1 SPRAY, SUSPENSION (ML) NASAL DAILY
Status: DISCONTINUED | OUTPATIENT
Start: 2017-01-01 | End: 2017-01-01

## 2017-01-01 RX ORDER — FUROSEMIDE 40 MG/1
40 TABLET ORAL DAILY
Status: DISCONTINUED | OUTPATIENT
Start: 2017-01-01 | End: 2017-01-01

## 2017-01-01 RX ORDER — POTASSIUM CHLORIDE 20 MEQ/1
40 TABLET, EXTENDED RELEASE ORAL EVERY 4 HOURS
Status: COMPLETED | OUTPATIENT
Start: 2017-01-01 | End: 2017-01-01

## 2017-01-01 RX ORDER — ACETAMINOPHEN 325 MG/1
650 TABLET ORAL EVERY 6 HOURS PRN
Status: DISCONTINUED | OUTPATIENT
Start: 2017-01-01 | End: 2017-01-01

## 2017-01-01 RX ORDER — MORPHINE SULFATE 2 MG/ML
2 INJECTION, SOLUTION INTRAMUSCULAR; INTRAVENOUS
Status: DISCONTINUED | OUTPATIENT
Start: 2017-01-01 | End: 2017-01-01

## 2017-01-01 RX ORDER — POTASSIUM CHLORIDE 20 MEQ/1
40 TABLET, EXTENDED RELEASE ORAL ONCE
Status: COMPLETED | OUTPATIENT
Start: 2017-01-01 | End: 2017-01-01

## 2017-01-01 RX ORDER — IPRATROPIUM BROMIDE AND ALBUTEROL SULFATE 2.5; .5 MG/3ML; MG/3ML
3 SOLUTION RESPIRATORY (INHALATION)
COMMUNITY

## 2017-01-01 RX ORDER — PANTOPRAZOLE SODIUM 40 MG/1
40 TABLET, DELAYED RELEASE ORAL
Status: DISCONTINUED | OUTPATIENT
Start: 2017-01-01 | End: 2017-01-01

## 2017-01-01 RX ORDER — ONDANSETRON 2 MG/ML
4 INJECTION INTRAMUSCULAR; INTRAVENOUS EVERY 6 HOURS PRN
Status: DISCONTINUED | OUTPATIENT
Start: 2017-01-01 | End: 2017-01-01

## 2017-01-01 RX ORDER — SODIUM CHLORIDE 0.9 % (FLUSH) 0.9 %
SYRINGE (ML) INJECTION
Status: COMPLETED
Start: 2017-01-01 | End: 2017-01-01

## 2017-01-01 RX ORDER — MORPHINE SULFATE 2 MG/ML
1 INJECTION, SOLUTION INTRAMUSCULAR; INTRAVENOUS EVERY 2 HOUR PRN
Status: DISCONTINUED | OUTPATIENT
Start: 2017-01-01 | End: 2017-01-01

## 2017-01-01 RX ORDER — ASPIRIN 81 MG/1
81 TABLET, CHEWABLE ORAL
Status: DISCONTINUED | OUTPATIENT
Start: 2017-01-01 | End: 2017-01-01

## 2017-01-01 RX ORDER — MORPHINE SULFATE 4 MG/ML
4 INJECTION, SOLUTION INTRAMUSCULAR; INTRAVENOUS EVERY 2 HOUR PRN
Status: DISCONTINUED | OUTPATIENT
Start: 2017-01-01 | End: 2017-01-01

## 2017-01-01 RX ORDER — FUROSEMIDE 40 MG/1
40 TABLET ORAL DAILY
Refills: 0 | Status: SHIPPED | COMMUNITY
Start: 2017-01-01

## 2017-01-01 RX ORDER — FERROUS GLUCONATE 324(37.5)
1 TABLET ORAL 3 TIMES DAILY
Qty: 30 TABLET | Refills: 0 | Status: SHIPPED | OUTPATIENT
Start: 2017-01-01 | End: 2017-01-01 | Stop reason: ALTCHOICE

## 2017-01-01 RX ORDER — SODIUM CHLORIDE 9 MG/ML
125 INJECTION, SOLUTION INTRAVENOUS CONTINUOUS
Status: DISCONTINUED | OUTPATIENT
Start: 2017-01-01 | End: 2017-01-01

## 2017-01-01 RX ORDER — HEPARIN SODIUM 5000 [USP'U]/ML
5000 INJECTION, SOLUTION INTRAVENOUS; SUBCUTANEOUS EVERY 12 HOURS SCHEDULED
Status: DISCONTINUED | OUTPATIENT
Start: 2017-01-01 | End: 2017-01-01

## 2017-01-01 RX ORDER — ALBUTEROL SULFATE 2.5 MG/3ML
2.5 SOLUTION RESPIRATORY (INHALATION) EVERY 6 HOURS PRN
Status: DISCONTINUED | OUTPATIENT
Start: 2017-01-01 | End: 2017-01-01

## 2017-01-01 RX ORDER — SODIUM CHLORIDE 9 MG/ML
INJECTION, SOLUTION INTRAVENOUS
Status: COMPLETED
Start: 2017-01-01 | End: 2017-01-01

## 2017-01-01 RX ORDER — BISACODYL 10 MG
10 SUPPOSITORY, RECTAL RECTAL
Status: DISCONTINUED | OUTPATIENT
Start: 2017-01-01 | End: 2017-01-01

## 2017-01-01 RX ORDER — FUROSEMIDE 40 MG/1
TABLET ORAL
Qty: 102 TABLET | Refills: 1 | Status: SHIPPED | OUTPATIENT
Start: 2017-01-01 | End: 2017-01-01

## 2017-01-01 RX ORDER — LISINOPRIL 20 MG/1
20 TABLET ORAL DAILY
Qty: 90 TABLET | Refills: 3 | Status: SHIPPED | OUTPATIENT
Start: 2017-01-01 | End: 2017-01-01

## 2017-01-01 RX ORDER — SODIUM CHLORIDE 0.9 % (FLUSH) 0.9 %
SYRINGE (ML) INJECTION
Status: DISPENSED
Start: 2017-01-01 | End: 2017-01-01

## 2017-01-01 RX ORDER — SODIUM CHLORIDE 9 MG/ML
INJECTION, SOLUTION INTRAVENOUS CONTINUOUS
Status: DISCONTINUED | OUTPATIENT
Start: 2017-01-01 | End: 2017-01-01

## 2017-01-01 RX ORDER — DOCUSATE SODIUM 100 MG/1
100 CAPSULE, LIQUID FILLED ORAL 2 TIMES DAILY
Status: DISCONTINUED | OUTPATIENT
Start: 2017-01-01 | End: 2017-01-01

## 2017-01-01 RX ORDER — LISINOPRIL 20 MG/1
20 TABLET ORAL DAILY
Status: DISCONTINUED | OUTPATIENT
Start: 2017-01-01 | End: 2017-01-01

## 2017-01-01 RX ORDER — METOPROLOL SUCCINATE 25 MG/1
25 TABLET, EXTENDED RELEASE ORAL
Status: DISCONTINUED | OUTPATIENT
Start: 2017-01-01 | End: 2017-01-01

## 2017-01-01 RX ORDER — ARIPIPRAZOLE 15 MG/1
40 TABLET ORAL ONCE
Status: COMPLETED | OUTPATIENT
Start: 2017-01-01 | End: 2017-01-01

## 2017-01-01 RX ORDER — SODIUM CHLORIDE 0.9 % (FLUSH) 0.9 %
3 SYRINGE (ML) INJECTION AS NEEDED
Status: DISCONTINUED | OUTPATIENT
Start: 2017-01-01 | End: 2017-01-01

## 2017-01-01 RX ORDER — SODIUM PHOSPHATE, DIBASIC AND SODIUM PHOSPHATE, MONOBASIC 7; 19 G/133ML; G/133ML
1 ENEMA RECTAL ONCE AS NEEDED
Status: ACTIVE | OUTPATIENT
Start: 2017-01-01 | End: 2017-01-01

## 2017-01-01 RX ORDER — ONDANSETRON 2 MG/ML
INJECTION INTRAMUSCULAR; INTRAVENOUS
Status: COMPLETED
Start: 2017-01-01 | End: 2017-01-01

## 2017-01-01 RX ORDER — MORPHINE SULFATE 2 MG/ML
INJECTION, SOLUTION INTRAMUSCULAR; INTRAVENOUS
Status: DISCONTINUED
Start: 2017-01-01 | End: 2017-01-01

## 2017-01-01 RX ORDER — ECHINACEA PURPUREA EXTRACT 125 MG
1 TABLET ORAL
Status: DISCONTINUED | OUTPATIENT
Start: 2017-01-01 | End: 2017-01-01

## 2017-01-01 RX ORDER — POLYETHYLENE GLYCOL 3350 17 G/17G
17 POWDER, FOR SOLUTION ORAL DAILY PRN
Status: DISCONTINUED | OUTPATIENT
Start: 2017-01-01 | End: 2017-01-01

## 2017-01-01 RX ORDER — IPRATROPIUM BROMIDE AND ALBUTEROL SULFATE 2.5; .5 MG/3ML; MG/3ML
3 SOLUTION RESPIRATORY (INHALATION) EVERY 6 HOURS PRN
COMMUNITY

## 2017-01-01 RX ORDER — BISACODYL 10 MG
10 SUPPOSITORY, RECTAL RECTAL
Refills: 0 | Status: SHIPPED | COMMUNITY
Start: 2017-01-01

## 2017-01-01 RX ORDER — CHLORAL HYDRATE 500 MG
1000 CAPSULE ORAL DAILY
Status: DISCONTINUED | OUTPATIENT
Start: 2017-01-01 | End: 2017-01-01

## 2017-01-01 RX ORDER — CEFADROXIL 500 MG/1
500 CAPSULE ORAL 2 TIMES DAILY
Qty: 6 CAPSULE | Refills: 0 | COMMUNITY
Start: 2017-01-01 | End: 2017-01-01 | Stop reason: ALTCHOICE

## 2017-01-01 RX ORDER — BIOTIN 10000 MCG
1 CAPSULE ORAL DAILY
COMMUNITY

## 2017-01-01 RX ORDER — FUROSEMIDE 20 MG/1
20 TABLET ORAL DAILY
Status: DISCONTINUED | OUTPATIENT
Start: 2017-01-01 | End: 2017-01-01

## 2017-01-01 RX ORDER — ONDANSETRON 2 MG/ML
4 INJECTION INTRAMUSCULAR; INTRAVENOUS ONCE
Status: COMPLETED | OUTPATIENT
Start: 2017-01-01 | End: 2017-01-01

## 2017-01-01 RX ORDER — FUROSEMIDE 10 MG/ML
20 INJECTION INTRAMUSCULAR; INTRAVENOUS ONCE
Status: COMPLETED | OUTPATIENT
Start: 2017-01-01 | End: 2017-01-01

## 2017-01-01 RX ORDER — METOPROLOL SUCCINATE 25 MG/1
25 TABLET, EXTENDED RELEASE ORAL
Refills: 0 | Status: SHIPPED | COMMUNITY
Start: 2017-01-01

## 2017-01-01 RX ORDER — ALBUTEROL SULFATE 90 UG/1
1 AEROSOL, METERED RESPIRATORY (INHALATION) EVERY 4 HOURS PRN
Status: DISCONTINUED | OUTPATIENT
Start: 2017-01-01 | End: 2017-01-01

## 2017-01-01 RX ORDER — ALBUTEROL SULFATE 90 UG/1
2 AEROSOL, METERED RESPIRATORY (INHALATION) EVERY 6 HOURS PRN
Status: DISCONTINUED | OUTPATIENT
Start: 2017-01-01 | End: 2017-01-01

## 2017-01-01 RX ORDER — SODIUM CHLORIDE 9 MG/ML
1000 INJECTION, SOLUTION INTRAVENOUS ONCE
Status: COMPLETED | OUTPATIENT
Start: 2017-01-01 | End: 2017-01-01

## 2017-01-01 RX ORDER — FAMOTIDINE 10 MG/ML
20 INJECTION, SOLUTION INTRAVENOUS DAILY
Status: DISCONTINUED | OUTPATIENT
Start: 2017-01-01 | End: 2017-01-01

## 2017-01-01 RX ORDER — MELATONIN
325
Status: DISCONTINUED | OUTPATIENT
Start: 2017-01-01 | End: 2017-01-01

## 2017-01-01 RX ORDER — MELATONIN
325
COMMUNITY

## 2017-01-01 RX ORDER — MORPHINE SULFATE 2 MG/ML
2 INJECTION, SOLUTION INTRAMUSCULAR; INTRAVENOUS EVERY 2 HOUR PRN
Status: DISCONTINUED | OUTPATIENT
Start: 2017-01-01 | End: 2017-01-01

## 2017-01-03 PROBLEM — R77.8 ELEVATED TROPONIN: Status: ACTIVE | Noted: 2017-01-01

## 2017-01-03 PROBLEM — R19.5 HEME POSITIVE STOOL: Status: ACTIVE | Noted: 2017-01-01

## 2017-01-03 PROBLEM — R55 SYNCOPE AND COLLAPSE: Status: ACTIVE | Noted: 2017-01-01

## 2017-01-03 PROBLEM — R79.89 ELEVATED TROPONIN: Status: ACTIVE | Noted: 2017-01-01

## 2017-01-03 PROBLEM — I95.9 HYPOTENSION, UNSPECIFIED HYPOTENSION TYPE: Status: ACTIVE | Noted: 2017-01-01

## 2017-01-03 NOTE — ED INITIAL ASSESSMENT (HPI)
Reported fall at home with syncopal episode. Reports head pain to back of head. Does not remember hitting head. Also reports blood in stool and abd pressure.  BP 88/40 by EMS

## 2017-01-04 NOTE — PLAN OF CARE
DISCHARGE PLANNING    • Discharge to home or other facility with appropriate resources Progressing    PLAN TO RETURN HOME    PAIN - ADULT    • Verbalizes/displays adequate comfort level or patient's stated pain goal Progressing    MEDICATE PT PRN    Guerline

## 2017-01-04 NOTE — CONSULTS
[facility]    Report of Consultation    [address] [facility] Patient Status:  Inpatient    10/2/1921 MRN [id number]   Location [facility] 2W/SW Attending Janel Bowman MD   Hosp Day # 1 PCP Laurita Roland MD     Date of Admission brain   • History of asthma    • Gallstones    • Hepatic cyst    • TIA (transient ischemic attack) 2000   • Restless leg syndrome    • Hyperlipidemia    • Carotid stenosis 2006   • Aortic stenosis    • Carotid artery disease (HCC)    • Hernia, abdominal History  Family History   Problem Relation Age of Onset   • Other[other] [OTHER] Mother      influenza- cause of death   • Other[other] [OTHER] Brother      passed away of \"mastoid\"   • Sharon Lunch Sister 15      frail   • Diabetes Neg    • by mouth daily.      CARBIDOPA-LEVODOPA  MG Oral Tab TAKE ONE TABLET BY MOUTH ONE TO THREE TIMES A DAY FOR RESTLESS LEG SYNDROME   furosemide (LASIX) 40 MG Oral Tab Take 1 tablet every day with an extra half tablet Monday Wednesday and Friday   Omega- tenderness  MUSCULOSKELETAL: no calf tenderness  PSYCH: normal affect  NUT: well nourished appearing      Results:     Laboratory Data:    Lab Results  Component Value Date   WBC 21.9* 01/04/2017   HGB 9.3* 01/04/2017   HCT 28.9* 01/04/2017    01/04 the globes/lens surgeries. Correlate with procedure history. OTHER: There is calcific atherosclerosis within the cavernous segments of the internal carotid arteries and in the vertebrobasilar system.   Dictated by (CST): Mitch Schulz MD on 1/03/2017 at 25 otherwise not well characterized due to lack soft tissue and intravenous contrast.  Left kidney is unremarkable there is also not well seen for the same reasons. AORTA/VASCULAR:   There is calcific aortic atherosclerosis, without aneurysm.  RETROPERITONEUM: intrahepatic pneumobilia and chronic intra and extrahepatic biliary ductal dilatation. There has been prior cholecystectomy. 4.  Prior bowel resection with reanastomosis in the right lower quadrant without obvious obstruction or inflammation.  5.  Cardiome [facility], X CHEST PORTABLE, 10/27/2015, 13:08. [facility], X CHEST PA LAT ROUTINE, 4/06/2015, 18:18. [facility], XR CHEST PA +  LAT CHEST (CPT=71020), 12/01/2016, 15:37.   [facility], X LINDSAY and with Dr. Suha Franco today. No further cardiac evaluation planned. Evaluation and treatment of sepsis in progress. Although she had Hemoccult-positive stools in the emergency room, her hemoglobin is unchanged from her baseline.   At the present time we have

## 2017-01-04 NOTE — CM/SW NOTE
+BPCI. Patient is a BPCI readmitted previously enrolled under  (arrthymia) on 12/20/16 with 76 days left in the BPCI episode which will end on 3/21/17.   51 Kendra Zuñiga [id number]

## 2017-01-04 NOTE — CONSULTS
[facility]    PATIENT'S NAME: Julio Justice   ATTENDING PHYSICIAN: Bartolo Lux MD   CONSULTING PHYSICIAN: Nuha Faye MD   PATIENT ACCOUNT#:   [id number]    LOCATION:  [place] [address]  MEDICAL RECORD #:   [id number]       DATE OF Iodine. SOCIAL HISTORY:  She is a nonsmoker. FAMILY HISTORY:  Noncontributory. REVIEW OF SYSTEMS:  GENERAL:  She complains of weakness. EYES:  Denies recent vision loss or changes. ENT:  She denies recent hearing loss or changes.   CARDIOVASCULAR ECG shows atrial fibrillation. ASSESSMENT AND PLAN:  A 77-year-old female who fell. 1.   Fall. This is most likely a combination of her abdominal pain and hypovolemia. Recommend hydration and management by the admitting physician.   She also has an

## 2017-01-04 NOTE — ED NOTES
Pt brought in from home via EMS for c/o fall. Pt states hitting her and +LOC. Per EMS, pt found on the toilet when EMS arrived on scene. Pt c/o intermittent head pain and lower ABD pain. On blood thinners at home.

## 2017-01-04 NOTE — ED PROVIDER NOTES
Patient Seen in: [facility] AND [facility] Emergency Department    History   Patient presents with:  Fall (musculoskeletal, neurologic)  Syncope (cardiovascular, neurologic)  Bleeding (hematologic)    Stated Complaint: fal, syncope, rectal bleed,     HPI    79 yo • Hypotropia 2008   • Subconjunctival hemorrhage 2012   • Stented coronary artery    • SIADH (syndrome of inappropriate ADH production) ([facility] [address]    • Breast cancer, left breast (HCC)    • Status post coronary artery stent placement      Patient had left kathleen OIL) 1000 MG Oral Capsule Delayed Release,  One capsule daily   Multiple Vitamin (STRESS FORMULA 500/BIOTIN) Oral Tab,  Take 1 tablet by mouth. acetaminophen (TYLENOL) 325 MG Oral Tab,  Take 650 mg by mouth every 6 (six) hours as needed for Pain.    BABY deformity. Neurological: Alert. CN II-XII grossly intact. BUE/BLE proximally and distally with 5/5 stregnth. Skin: Skin is warm. No cutaneous manifestations of trauma. Psychiatric: Cooperative. Nursing note and vitals reviewed.         ED Course     Lab -----------         ------                     BLOOD TYPE, ABO AND RH A[[id number]]                                                    ANTIBODY [facility][[id number]]                                                               Please view results for these dario vertebrobasilar system. Dictated by (CST): Iwona Vasquez MD on 1/03/2017 at 18:45     Approved by (CST): Iwona Vasquez MD on 1/03/2017 at 18:46          1/3/2017  CONCLUSION:  1. No acute intracranial process.   There is chronic age advanced cerebral and ce is calcific aortic atherosclerosis, without aneurysm. RETROPERITONEUM: No mass or enlarged adenopathy.   BOWEL/MESENTERY:  There has been prior bowel resection with reanastomosis in the right lower quadrant no convincing evidence of small or large bowel obs quadrant without obvious obstruction or inflammation. 5.  Cardiomegaly. Aortic valve prosthesis. Mitral annular calcifications. 6.  The visualized lung bases have airway changes compatible with bronchitis.  7.  Chronic mild wedge compression deformity at (CPT=71020), 12/01/2016, 15:37. [facility], X CHEST PORTABLE, 9/25/2016, 5:54. [facility], X CHEST PORTABLE, 4/12/2016, 23:58. INDICATIONS: Heart palpitations and tachycardia today.  History of paroxysmal atrial fibrillati Evaluation for syncope while attempting to stand with secondary BHT and c/o dark stool - GEOVANY without gross blood/melena though guiaic positive - T&S sent, PPI intiiated.  Labs notable for minimally elevated troponin with unchanged/nonacute EKG - given g

## 2017-01-04 NOTE — H&P
[facility]    PATIENT'S NAME: Kesha Thurman   ATTENDING PHYSICIAN: Louis Tang MD   PATIENT ACCOUNT#:   [de-identified]    LOCATION:  [place] [address]  MEDICAL RECORD #:   [id number]       YOB: 1921  ADMISSION DATE:       01/ main coronary artery disease, status post PCI and stent. Chronically elevated troponin. Restless leg syndrome. Hypertension. Large ventral abdominal hernia. History of gallstone pancreatitis, dyslipidemia, bilateral carotid stenosis of 50%.     PAST GIVENS intact. Last echocardiogram done December 21 showed normal ejection fraction, mild stenosis of the aortic valve with a gradient of 14, mitral valve normal thickness, mild to moderate regurgitation. ASSESSMENT:    1.    Combination of syncopal episode

## 2017-01-04 NOTE — HISTORICAL OFFICE NOTE
Tacho Garcia  : 10/02/1921  ACCOUNT:  [id number]  [phone]  PCP: Dr. Edith Winston     TODAY'S DATE: 10/22/2014  DICTATED BY:  Vanessa Fuentes M.D. ]    CHIEF COMPLAINT: [Followup of .  CAD, of native vessels, Followup of Benign Essential Hypertensio mastectomy. GI: soft, nontender. MS: adequate gait for exercise testing/testing. EXT: no clubbing or cyanosis. SKIN: no rashes, lesions, ulcers and left lower chest 2cm raised sebaceous intact cyst with pale pink edges.   NEURO/PSYCH: alert, oriented to ti 0.31MG/3  as needed                                06/01/11 Protonix              40MG      1 daily                                  06/02/10 Zocor                 20MG      1 daily at bedtime                       06/02/10 Multivitamins

## 2017-01-05 NOTE — OCCUPATIONAL THERAPY NOTE
OCCUPATIONAL THERAPY EVALUATION - INPATIENT     Room Number: 525/525-A  Evaluation Date: 1/5/2017  Type of Evaluation: Initial  Presenting Problem:  (Fall)    Physician Order: IP Consult to Occupational Therapy  Reason for Therapy: ADL/IADL Dysfunction and Hepatic cyst    • TIA (transient ischemic attack) 2000   • Restless leg syndrome    • Hyperlipidemia    • Carotid stenosis 2006   • Aortic stenosis    • Carotid artery disease (HCC)    • Hernia, abdominal      hernia repair   • Cholelithiasis    • Small guru Layout: One level  Lives With: Alone           Other Equipment:  (Rollator )          Drives: No       Stairs in Home: 0  Use of Assistive Device(s): Patient lives at [address] [place] [address].     Prior Level of Turner:  Patient was independent with all se Extremity Dressing: min a       Education Provided: Educated patient in role of OT and POC   Patient End of Session: Up in chair;Needs met;Call light within reach;RN aware of session/findings; All patient questions and concerns addressed      OT Goals     P

## 2017-01-05 NOTE — PLAN OF CARE
DISCHARGE PLANNING    • Discharge to home or other facility with appropriate resources Progressing        PAIN - ADULT    • Verbalizes/displays adequate comfort level or patient's stated pain goal Progressing        SAFETY ADULT - FALL    • Free from fall

## 2017-01-05 NOTE — PROGRESS NOTES
[place] [facility]    Progress Note    [address] [facility] Patient Status:  Inpatient    10/2/1921 MRN [id number]   Location [facility] 5SW/SE Attending Vasyl Noguera MD   Hosp Day # 2 PCP Riaz Sahni MD       Subjective:    Chasidy Bennett CREATSERUM 0.81 01/05/2017   BUN 27* 01/05/2017    01/05/2017   K 4.0 01/05/2017   K 4.0 01/05/2017   * 01/05/2017   CO2 21* 01/05/2017   * 01/05/2017   CA 8.7 01/05/2017   ALB 2.7* 01/05/2017   ALKPHO 37 01/05/2017   BILT 0.6 01/05/20 CHF. 2. The rest of the findings are stable. 3. Cardiomegaly. 4. Postop changes. 5. Atherosclerosis. 6. Demineralization. 7. Scoliosis. 8. Osteoarthritis. 9. Right mastectomy. Xr Chest Ap Portable  (cpt=71010)    1/3/2017  CONCLUSION:  1.   Small bi

## 2017-01-05 NOTE — PROGRESS NOTES
[facility]    Progress Note    [address] [facility] Patient Status:  Inpatient    10/2/1921 MRN [id number]   Location [facility] 5SW/SE Attending Andres Jennings MD   Hosp Day # 1 PCP Bessie Vance MD       Subjective:    Rossi Flowers 09/22/2015   ALKPHO 68 01/19/2013   BILT 0.7 09/22/2015   TP 6.5 09/22/2015   AST 21 09/22/2015   ALT 10* 09/22/2015   PTT 29.6 01/03/2017   INR 1.4* 01/03/2017   PT 16.6* 01/15/2013   T4F 1.17 07/05/2016   TSH 3.20 07/05/2016   ESRML 45* 01/16/2013   BNP Interpretation  -------------------------- Sinus Bradycardia -consider anterolateral injury.  ABNORMAL When compared with ECG of 12/29/2016 12:09:52 No significant changes have occurred Electronically signed on 01/03/2017 at 17:34 by Beka Artis

## 2017-01-05 NOTE — PROGRESS NOTES
[facility] Progress Note      Hien Peter Sharmabraeden Patient Status:  Inpatient    10/2/1921 MRN [id number]   Location [facility] 5SW/SE Attending Karen Armenta MD   Hosp Day # 2 PCP Shanique Ryder MD rhythm, systolic murmur  Abd: Abdomen soft, nontender, nondistended, no organomegaly, bowel sounds present  Ext:  no clubbing, no cyanosis,trace edema  Neuro: no focal deficits  Skin: no rashes or lesions, bruising present with intact skin    Scheduled Med assessment of the solid organs. 2.  There is nonspecific right hydronephrosis which is grossly unchanged since prior exam. 3.  Chronic intrahepatic pneumobilia and chronic intra and extrahepatic biliary ductal dilatation.   There has been prior cholecystect

## 2017-01-05 NOTE — PLAN OF CARE
PAIN - ADULT    • Verbalizes/displays adequate comfort level or patient's stated pain goal Progressing        Patient/Family Goals    • Patient/Family Long Term Goal Progressing    • Patient/Family Short Term Goal Progressing        SAFETY ADULT - FALL

## 2017-01-06 NOTE — PROGRESS NOTES
I spoke with Dr. Tariq Sarabia who spoke with the patient's son who does not want any aggressive interventions performed including endoscopy to evaluate her Hemoccult-positive stools. Her hemoglobin has drifted downwards a bit and we will follow this.

## 2017-01-06 NOTE — CONSULTS
INFECTIOUS DISEASE CONSULT NOTE    Antonia Underwood Patient Status:  Inpatient    10/2/1921 MRN [id number]   Location [facility] 5SW/SE Attending Ronold Castleman, MD   Hosp Day # 2 PCP Da • Hyperlipidemia    • Carotid stenosis 2006   • Aortic stenosis    • Carotid artery disease (HCC)    • Hernia, abdominal      hernia repair   • Cholelithiasis    • Small bowel obstruction ([facility] [address]      small bowel resection  2012   • Bowel dysfunction 2012 Veronica Chahal Sister 15      frail   • Diabetes Neg    • Glaucoma Neg    • Macular degeneration Neg       reports that she has never smoked. She does not have any smokeless tobacco history on file. She reports that she drinks alcohol.  She report source Oral, resp. rate 18, height 4' 10\" (1.473 m), weight 81 lb 11.2 oz (37.059 kg), SpO2 92 %, not currently breastfeeding. General: Alert, oriented, NAD, thin female  HEENT: Moist mucous membranes. Neck: No lymphadenopathy. Supple.   Respiratory: blood in the stool with limited exam with nonspecific right hydronephrosis that is unchanged, chronic intrahepatic pneumobilia and chronic intra-and extrahepatic biliary dilation status post cholecystectomy, previous bowel resection with reanastomosis, aor

## 2017-01-06 NOTE — PHYSICAL THERAPY NOTE
PHYSICAL THERAPY EVALUATION - INPATIENT     Room Number: 525/525-A  Evaluation Date: 1/6/2017  Type of Evaluation: Initial  Physician Order: PT Eval and Treat    Presenting Problem: syncope  Reason for Therapy: Mobility Dysfunction and Discharge Planni artery stent placement      Patient had left main stent placed at the time of her TAVR   • Pneumonia      2015       Past Surgical History      Past Surgical History    VENTRAL HERNIA REPAIR      MASTECTOMY RIGHT Right     HYSTERECTOMY      BOWEL RESECTION and with activity 95%  O2 Device: Nasal cannula  Liters of O2:  3  Heart Rate: at rest 60 and with activity 70    AM-PAC '6-Clicks' INPATIENT SHORT FORM - BASIC MOBILITY  How much difficulty does the patient currently have. ..  -   Turning over in bed (incl training;Transfer training; Endurance  Rehab Potential : Good  Frequency (Obs): 3x/week         CURRENT GOALS    Goal #1 Patient is able to demonstrate supine - sit EOB @ level: independent     Goal #2 Patient is able to demonstrate transfers Sit to/from [facility]

## 2017-01-06 NOTE — DIETARY NOTE
ADULT NUTRITION INITIAL ASSESSMENT    Pt is at moderate nutrition risk. Pt meets malnutrition criteria.       CRITERIA FOR MALNUTRITION DIAGNOSIS:  Criteria for non-severe malnutrition diagnosis: chronic illness related to energy intake less than75% for gr cancer, HTN, Gallstones, TIA, Hyperlipidemia,SBO with partial resection, CHF, SIADH, Cholecystectomy    ANTHROPOMETRICS:  HT: 147.3 cm (4' 10\")  WT: 35.154 kg (77 lb 8 oz)   BMI: Body mass index is 16.2 kg/(m^2).   BMI CLASSIFICATION: less than 19 kg/m2 -

## 2017-01-06 NOTE — PLAN OF CARE
Problem: ALTERED NUTRIENT INTAKE - ADULT  Goal: Nutrient intake appropriate for improving, restoring or maintaining nutritional needs  INTERVENTIONS:  - Assess nutritional status and recommend course of action  - Monitor oral intake, labs, and treatment pl

## 2017-01-06 NOTE — PROGRESS NOTES
[facility] Pharmacy Note:  Renal Adjustment for Zosyn (piperacillin/tazobactam)    Shirley Oliveira is a 80year old female who has been prescribed Zosyn (piperacillin/tazobactam) 3.375 gm every 12 hrs.   CrCl is estimated creatinine clearance is 27.5 mL/min (b

## 2017-01-06 NOTE — PROGRESS NOTES
[facility]    Progress Note    [address] [facility] Patient Status:  Inpatient    10/2/1921 MRN [id number]   Location [facility] 5SW/SE Attending Kayce Salgado MD   Hosp Day # 3 PCP Eloisa Qureshi MD       Subjective:    Chilo Kevin CREATSERUM 1.17 01/04/2017   BUN 37* 01/04/2017    01/04/2017   K 3.8 01/04/2017   * 01/04/2017   CO2 21* 01/04/2017   * 01/04/2017   CA 7.8* 01/04/2017   ALB 3.8 09/22/2015   ALKPHO 68 01/19/2013   BILT 0.7 09/22/2015   TP 6.5 09/22/2 well as dense mitral annular calcifications. Correlate for CHF. 2.  Chronic interstitial changes suggesting mild edema or fibrosis/scar.           Ekg 12-lead    1/3/2017  ECG Report  Interpretation  -------------------------- Sinus Bradycardia -consider a

## 2017-01-06 NOTE — PROGRESS NOTES
[place] [facility]    Progress Note    [address] [facility] Patient Status:  Inpatient    10/2/1921 MRN [id number]   Location [facility] 5SW/SE Attending Kayce Salgado MD   Hosp Day # 3 PCP Eloisa Qureshi MD       Subjective:    Chilo Kevin 01/06/2017   CREATSERUM 0.68 01/06/2017   BUN 22* 01/06/2017    01/06/2017   K 3.4 01/06/2017    01/06/2017   CO2 23 01/06/2017   * 01/06/2017   CA 8.2* 01/06/2017   ALB 2.7* 01/05/2017   ALKPHO 37 01/05/2017   BILT 0.6 01/05/2017   TP 5

## 2017-01-06 NOTE — PROGRESS NOTES
[facility]  Gastroenterology Progress Note    Arron Underwood Patient Status:  Inpatient    10/2/1921 MRN [id number]   Location [facility] 5SW/SE Attending Bassam Delgado MD   Hosp Day # 3 PCP Mario Barreto MD     Subjective:   Jacqui Cordero artery of native heart with angina pectoris ([address]     At risk for falling     Malignant neoplasm of left female breast ([address]     Abdominal wall hernia     S/P TAVR (transcatheter aortic valve replacement)     COPD (chronic obstructive pulmonary disease) (H

## 2017-01-07 NOTE — PROGRESS NOTES
[place] [facility]    Progress Note    [address] [facility] Patient Status:  Inpatient    10/2/1921 MRN [id number]   Location [facility] 5SW/SE Attending Oscar Mckenzie MD   Hosp Day # 4 PCP Mehnaz Foy MD       Subjective:   She has n

## 2017-01-07 NOTE — PROGRESS NOTES
[place] [facility] - [facility]    Progress Note    [address] [facility] [place] Status:  Inpatient    10/2/1921 MRN [id number]   Location [facility] 5SW/SE Attending Lidia Fierro MD   Hosp Day # 4 PCP Lizbeth Chau MD       Subjective:    Tova Sims 09/22/2015   ALT 10* 09/22/2015   PTT 29.6 01/03/2017   INR 1.4* 01/03/2017   PT 16.6* 01/15/2013   T4F 1.17 07/05/2016   TSH 3.20 07/05/2016   ESRML 45* 01/16/2013   BNP 1052* 09/25/2016   MG 2.0 12/22/2016   PHOS 3.4 12/22/2016   TROP 0.04* 01/03/2017 injury.  ABNORMAL When compared with ECG of 12/29/2016 12:09:52 No significant changes have occurred Electronically signed on 01/03/2017 at 17:34 by Ayaka Horton and Plan:   Syncope - related to sepsis with hypotension/ elevated lactic

## 2017-01-07 NOTE — PROGRESS NOTES
[facility] Heart Cardiology  Progress Note    Hillary Eva Persenico Patient Status:  Inpatient    10/2/1921 MRN [id number]   Location [facility] 5SW/SE Attending Penny Felipe MD   Hosp Day # 4 PCP Gayla Orozco MD 07/05/2016   TSH 3.20 07/05/2016   ESRML 45* 01/16/2013   BNP 1052* 09/25/2016   MG 1.9 01/07/2017   PHOS 3.4 12/22/2016   TROP 0.04* 01/03/2017   B12 476 10/17/2015   POCGLU 113* 01/21/2013       Xr Chest Ap Portable  (cpt=71010)    1/5/2017  CONCLUSION:

## 2017-01-08 NOTE — PROGRESS NOTES
[place] [facility]    Progress Note    Mandi Comp Persenico Patient Status:  Inpatient    10/2/1921 MRN [id number]   Location [facility] 5SW/SE Attending Tavares Santacruz MD   Hosp Day # 5 PCP Nubia Sherman MD       Subjective:    Mandi Comp

## 2017-01-08 NOTE — PROGRESS NOTES
[facility]    Progress Note    [address] [facility] Patient Status:  Inpatient    10/2/1921 MRN [id number]   Location [facility] 5SW/SE Attending Denisse Cortés MD   Hosp Day # 5 PCP Spencer Goodpasture, MD       Subjective:    Debby Ramirez 01/19/2013   BILT 0.7 09/22/2015   TP 6.5 09/22/2015   AST 21 09/22/2015   ALT 10* 09/22/2015   PTT 29.6 01/03/2017   INR 1.4* 01/03/2017   PT 16.6* 01/15/2013   T4F 1.17 07/05/2016   TSH 3.20 07/05/2016   ESRML 45* 01/16/2013   BNP 1052* 09/25/2016   MG 2 -------------------------- Sinus Bradycardia -consider anterolateral injury.  ABNORMAL When compared with ECG of 12/29/2016 12:09:52 No significant changes have occurred Electronically signed on 01/03/2017 at 17:34 by Maldonado Portillo and

## 2017-01-08 NOTE — DISCHARGE PLANNING
SW met w/ pt to discuss PT rec of SNF per MD order. Pt stated that she refuses to go to a SNF. SW discussed safety and making sure there is a safe plan set prior to d/c.  Pt stated she lives at [facility] ans goes to [facility] PT through [facility] 2x

## 2017-01-09 NOTE — PLAN OF CARE
Problem: Patient/Family Goals  Goal: Patient/Family Long Term Goal  Patient’s Long Term Goal: to return home    Interventions:  IV antibiotic as ordered  PT/SW following    - See additional Care Plan goals for specific interventions   Outcome: Progressing

## 2017-01-09 NOTE — PLAN OF CARE
Discharge note: pt is discharged per w/c [facility] to [facility] rehab. Pt was seen by Dr. Makenzie Price and Dr. Chidi Chapman prior to discharge. Pt to have boyle in place on discharge. Report was called into [place] at [facility].  Pt tele box and saline lock were discont

## 2017-01-09 NOTE — DISCHARGE PLANNING
SW received call from RN who states the pt is now agreeable to rehab placement, preference indicated for [facility] snf. SW initiated referral to [facility], awaiting review. DON screen requested to [facility].     Pt is currently on 2L O2, which would require

## 2017-01-09 NOTE — DISCHARGE PLANNING
Pt has been accepted at [facility]. RN states pt is currently tolerating room air and may not need the ambulance at dc. SW will arrange transfer once dc order is obtained.     cristy [facility] [id number]

## 2017-01-09 NOTE — DISCHARGE SUMMARY
[facility]  Discharge Summary    [address] [facility] Patient Status:  Inpatient    10/2/1921 MRN [id number]   Location [facility] 5SW/SE Attending Ronold Castleman, MD   [address] Day # 6 PCP Carolyn Diaz MD     Date of Admission: 1/3/2017    D tablet (324 mg total) by mouth 3 (three) times daily. Qty: 30 tablet Refills: 0      CONTINUE these medications which have NOT CHANGED    dronedarone HCl 400 MG Oral Tab  Take 1 tablet (400 mg total) by mouth 2 (two) times daily with meals.   Qty: 60 table The patient is a 42-year-old,  female who had constipation today.  She tried to push bowel movement, and she could not do it or urinate.  She felt some abdominal cramps, and she stood up.  She felt nauseated, and she almost passed out.  Called the further and cont to monitor  -Dr Sanjuana Gilliam d/w patient and son- would not want aggressive investigation  -also may be related to frequent nosebleeds      Protein calorie malnutrition  - dietician consult    Physical Exam:  GENERAL:  Alert and oriented to time

## 2017-01-09 NOTE — PROGRESS NOTES
[facility]    GI Progress Note      [address] [facility] Patient Status:  Inpatient    10/2/1921 MRN [id number]   Location [facility] 5SW/SE Attending Sheron Davis MD   Hosp Day # 6 PCP Laurita Roland MD          SUBJECTIVE:

## 2017-01-09 NOTE — PROGRESS NOTES
INFECTIOUS DISEASE PROGRESS NOTE    [address] [facility] Patient Status:  Inpatient    10/2/1921 MRN [id number]   Location [facility] 5SW/SE Attending Elizabeth Moreno MD   Hosp Day # 6 PCP Freddy Galo MD     Subjective:  Patient seen and examin syndrome     HTN (hypertension)     History of cholecystectomy     Carotid stenosis     Microscopic hematuria     Mitral regurgitation     Perivalvular leak of prosthetic heart valve     Diastolic CHF ([address]     DNR (do not resuscitate)     Shortness of blanca d/c Zosyn - day #7  - reviewed labs, micro, imaging reports  - will follow      [facility]  [phone]

## 2017-01-09 NOTE — PHYSICAL THERAPY NOTE
PHYSICAL THERAPY TREATMENT NOTE - INPATIENT    Room Number: 525/525-A     Session:       Presenting Problem: syncope    Problem List  Principal Problem:    Syncope and collapse  Active Problems:    Elevated troponin    Hypotension, unspecified hypotension REPAIR      MASTECTOMY RIGHT Right     HYSTERECTOMY      BOWEL RESECTION  2012    Comment small bowel resection    CATH TRANSCATHETER AORTIC VALVE REPLACEMENT  2013    CATH PERCUTANEOUS  TRANSLUMINAL CORONARY ANGIOPLASTY      Comment PTCA and RICO of the le (G-Code): CK    FUNCTIONAL ABILITY STATUS  Gait Assessment   Gait Assistance:  (CG)  Distance (ft): 40  Assistive Device: Rolling walker  Pattern:  (flexed head trunk and head)  Stoop/Curb Assistance: Not tested     Bed mobility:  Supervised supine to sit

## 2017-01-09 NOTE — DISCHARGE PLANNING
RN states dc order has been obtained for today. SW confirmed bed at [facility] for today. Pt is on room air, so an ambulance is no longer needed.  Priyank Ayoub has been arranged for today 1/9 at 4:45pm, per son's request. Update given to the son Kailee Atwood via

## 2017-01-12 NOTE — TELEPHONE ENCOUNTER
Please call pt son Juna Javed. Would like to discuss pt with Dr Sunil Ovalles.   Pt is currently at [facility]  Requesting order for medication for restless leg be sent to [facility], takes at bedtime  Pt urine is red, samples sent to lab, 3rd day  Jose David

## 2017-01-13 NOTE — TELEPHONE ENCOUNTER
Pt's [facility] would like to speak to  about pt's medication for restless leg syndrome. He said that the nursing home is not comfortable with the medication/directions, and pt is not getting what she is used to for 20 years.  She is not able to sleep/res

## 2017-01-13 NOTE — TELEPHONE ENCOUNTER
Discussed with Omar Chopra. Patient takes carbidopa levodopa 25/100 1 tablet at 9 PM every night for restless legs. She has been doing this for many many years. She will repeat the dose at midnight as needed. She has tolerated this extremely well.   She wishes

## 2017-01-13 NOTE — TELEPHONE ENCOUNTER
Please notify patient that I will have to call him later this afternoon. I apologize for not calling back sooner. Because I am not on staff at [facility] I will not be able to order any of her medications.   Please ask him to call [facility] and ask the [facility]

## 2017-01-13 NOTE — TELEPHONE ENCOUNTER
Pt's son calling back. Never heard from Dr. Nilton Gracia yesterday. He would like to speak with Dr. Nilton Gracia. He can be reached at [phone].

## 2017-01-20 NOTE — TELEPHONE ENCOUNTER
Discussed with Paz Beardenchnu. I called nursing home. Spoke to nurse. They have already been in contact with the physician Dr. Job Coleman. Urine and urine culture ordered.

## 2017-01-20 NOTE — TELEPHONE ENCOUNTER
Pt is at rehab center, and is having problems with the urinary catheter. Son would like to speak to .                 Tasked to

## 2017-01-30 NOTE — TELEPHONE ENCOUNTER
Josemanuel Avelar is calling because he has a question regarding his mom's medication she takes for A-fib he's not sure of the name ph.  # [phone]   Routed to clinical.

## 2017-01-31 NOTE — TELEPHONE ENCOUNTER
Called patient's son, Maylin Wadsworth, back. Reviewed med list with him. Med module updated.      Maylin Wadsworth reports Multaq is a new medication patient is on--did not see it ordered in EPIC or reconciled in patient's list while in hospital. Son reports it was ordered by

## 2017-01-31 NOTE — TELEPHONE ENCOUNTER
KWABENA Main with message from Dr. Grayce Eisenmenger. Also relayed to him what was told to me by Dian Paul at [facility]--that patient should be on Multaq 400 mg twice daily and that Eliquis was D/C'd secondary to severe anemia.  Instructed him to call office back to confirm

## 2017-01-31 NOTE — TELEPHONE ENCOUNTER
According to hospital discharge papers and what she had been on before considering the furosemide the furosemide should be 40 mg.  1 tablet on Sunday Tuesday Thursday and Saturday and a half tablet Monday Wednesday and Friday.   We can continue this dose un

## 2017-01-31 NOTE — TELEPHONE ENCOUNTER
Kimberly Cooper absolutely correct. I misread the medication list.  Furosemide should be 40 mg tablets. 1 and 1/2 tablets on Monday Wednesday and Friday and one tablet Sunday Tuesday Thursday and Saturday. Thank him for checking on this.

## 2017-01-31 NOTE — TELEPHONE ENCOUNTER
Pt's son Britany Hsu called - pt is with him now, released from re-hab. He has medication questions- needs to know what to give her.              Tasked to Nursing

## 2017-01-31 NOTE — TELEPHONE ENCOUNTER
Relayed message to patients son. Valentín Hsieh states patient use to take 1 tablet Sun-Tues-Thur-Sat and 1.5 tablets on Mon-Wed-Fri , please advise 1.5 tablets or 0.5 tablets on Mon - Wed- Fri ? Patient is daisy for follow up 2/7.

## 2017-02-03 NOTE — TELEPHONE ENCOUNTER
Leonorjairo Arguetamatt son calling - cbr at [phone]    He is stating pt is getting up frequently to urinate     Pt is able to urinate but not sure if she is having burning or pain     Pt just finished antibiotics     Son is asking to speak to someone     Pharm using os

## 2017-02-03 NOTE — TELEPHONE ENCOUNTER
Patients son Rosalba Hansen called back, he got home and is now with patient.  I spoke with patient who is complaining of urinary frequency and urgency since last night, patient states she kept going to the bathroom all night long because she had the urge to [employer]

## 2017-02-03 NOTE — TELEPHONE ENCOUNTER
Dr. Brigitte Sabillon, per son Maxine Malik, patient was discharged from [facility] on Saturday. While at [facility] she was treated for a UTI with Augmentin that they think she contracted from catheterization during hospitalization.  She was on Augmentin and she finished cours

## 2017-02-03 NOTE — TELEPHONE ENCOUNTER
Please see if she has home health care. If she has home health care we can ask nursing to come out and evaluate her. We would want to get both a urinalysis and urine culture.

## 2017-02-03 NOTE — TELEPHONE ENCOUNTER
Spoke to patients son, patient does not have home health care set-up yet, son states they will be meeting with an agency on Tuesday.  To Caleb Cross

## 2017-02-03 NOTE — TELEPHONE ENCOUNTER
MARY ANNTCJILLIAN at patients home number.  Called patients son Neri Holloway and notified him that we would like to speak to the patient, Neri Holloway states patient is currently with his brother and he will have patient call us back

## 2017-02-03 NOTE — TELEPHONE ENCOUNTER
Please call patient and see what symptoms she is currently having. I can then make a decision on next steps.

## 2017-02-03 NOTE — TELEPHONE ENCOUNTER
I did contact patients son Smooth Garza and relayed 's message to him. Jose David verbalized understanding.

## 2017-02-03 NOTE — TELEPHONE ENCOUNTER
We can offer patient an appointment today so I can check her and get a urine sample. Or if unable to come to the office I can try and treat over the phone.

## 2017-02-03 NOTE — TELEPHONE ENCOUNTER
Please call Kelly Burleson and let him know that I called in a antibiotic to patient's [facility] in [place] is on our list which she can take twice a day for 3 days. This is a general antibiotic for UTIs. If she does not feel better we should see Monday.   I

## 2017-02-07 NOTE — PROGRESS NOTES
HPI:   Awilda Adler is a 80year old female presents with the following problems. Patient presents for post hospital visit. Review of last hospitalization records show that she was admitted from January 3 - January 9.   She then went to [facility] arterial pressure. Patient has lost weight. Her current weight is 72 pounds. Her weight December 1 was 79 pounds. September 2016 she was 81 pounds. She has been eating well now. Intermittent abdominal cramps. Mostly on the right side. She gets sh total) by mouth 3 (three) times daily. Disp: 30 tablet Rfl: 0   Potassium Chloride ER 10 MEQ Oral Tab CR Take 1 tablet (10 mEq total) by mouth daily.  Disp: 30 tablet Rfl: 11   PANTOPRAZOLE SODIUM 40 MG Oral Tab EC TAKE 1 TABLET (40 MG) BY ORAL ROUTE ONCE D Cholelithiasis    • Small bowel obstruction ([facility] [address]      small bowel resection  2012   • Bowel dysfunction 2012     \"infarcted bowel\"   • Congestive heart failure (CHF) (HCC)    • S/P TAVR (transcatheter aortic valve replacement) 2013   • Paroxysmal atrial or performed during the hospital encounter of 77/62/13  -BASIC METABOLIC PANEL (8)   Result Value Ref Range   Glucose 137 (H) 70-99 mg/dL   Sodium 138 136-144 mmol/L   Potassium 3.9 3.3-5.1 mmol/L   Chloride 102  mmol/L   CO2 21 (L) 22-32 mmol/L   BU 2.2 0.5-2.2 mmol/L   -BASIC METABOLIC PANEL (8)   Result Value Ref Range   Glucose 157 (H) 70-99 mg/dL   Sodium 140 136-144 mmol/L   Potassium 3.8 3.3-5.1 mmol/L   Chloride 112 (H)  mmol/L   CO2 21 (L) 22-32 mmol/L   BUN 37 (H) 8-20 mg/dL   Creatinin 3. 3-5.1 mmol/L   Chloride 109  mmol/L   CO2 23 22-32 mmol/L   BUN 22 (H) 8-20 mg/dL   Creatinine 0.68 0.50-1.50 mg/dL   Calcium, Total 8.2 (L) 8.5-10.5 mg/dL   BUN/CREA Ratio 32.4 (H) 10.0-20.0   Anion Gap 8 0-18   Calculated Osmolality 294 275-295 m -American >60 >=60   -BLOOD TYPE, ABO AND RH D   Result Value Ref Range   ABO BLOOD TYPE O    RH BLOOD TYPE Positive    -ANTIBODY SCREEN   Result Value Ref Range   Antibody Screen Negative    -POCT GLUCOSE   Result Value Ref Range   POC Glucose  131 0.0-1.0 K/UL   Eosinophil Absolute 0.0 0.0-0.7 K/UL   Basophil Absolute 0.0 0.0-0.2 K/UL   -CBC W/ DIFFERENTIAL   Result Value Ref Range   WBC 18.6 (H) 4.0-11.0 K/UL   RBC 3.18 (L) 3.70-5.40 M/UL   HGB 8.3 (L) 12.0-16.0 g/dL   HCT 25.5 (L) 35.0-48.0 %   MC Ref Range   WBC 9.6 4.0-11.0 K/UL   RBC 3.07 (L) 3.70-5.40 M/UL   HGB 8.1 (L) 12.0-16.0 g/dL   HCT 24.5 (L) 35.0-48.0 %   MCV 79.6 (L) 80.0-100.0 fL   MCH 26.5 (L) 27.0-32.0 pg   MCHC 33.3 32.0-37.0 g/dl   RDW 17.8 (H) 11.0-15.0 %    140-400 K/UL lesions  HEENT:  Atraumatic.    pharynx without exudate or erythema  EYES:  PERRL . conjunctiva clear. NECK:  supple, no adenopathy,   LUNGS:  clear to auscultation. Effort normal  CARDIO:  RRR without murmur.     S1 and S2 normal  GI:  good BS's. no joe

## 2017-02-13 NOTE — TELEPHONE ENCOUNTER
Discussed labs with Yvonne. Labs acceptable. Slight anemia but improved. Electrolytes unremarkable. Patient off iron since it bothered her stomach. She is asymptomatic now. Starting to a little bit better. Discussed her weight.   With her multiple dk

## 2017-02-23 NOTE — TELEPHONE ENCOUNTER
I think it would be rice if patient avoided any contact with people that are sick with upper respiratory infections or any infections at all. She would have difficulty fighting off infections.

## 2017-02-23 NOTE — TELEPHONE ENCOUNTER
Pt son calling they have a family function they were told there will be a couple people there getting over Swine Flu, is this somewhere the pt should be, please call Marley Marie [phone]     Tasked to Rosalva Quezada

## 2017-03-09 NOTE — TELEPHONE ENCOUNTER
From residential     [place] Person states she called yesterday and she did not hear from doctor and is calling back today     To loose bowel movements after breakfast     She is concerned she weights only 74 pounds     Scared of maybe dehydration     Pt just call

## 2017-03-09 NOTE — TELEPHONE ENCOUNTER
Please ask how many loose bowel movements she is having per day. We are NOT able to give imodium with her bowel problem. Is she on anything new in her diet that could contribute to this.

## 2017-03-09 NOTE — TELEPHONE ENCOUNTER
She had two loose bowel movements yesterday and one today. Micah Leal [facility] ACUTE [facility] CARE AT [facility] RN) states pt has been eating herring every day since New Years. Told  RN that she cannot have Imodium. She will tell her to try not eating herring.

## 2017-03-09 NOTE — TELEPHONE ENCOUNTER
Requesting order to extend OT for two more weeks  Pierre Pardo will be out of office for next two days, ok to leave voice mail  Tasked to nursing

## 2017-03-09 NOTE — TELEPHONE ENCOUNTER
Left message on Arlen's confidential VM that ok per Reno Cervantes to extend patients OT and whatever other services she is getting.

## 2017-03-14 NOTE — TELEPHONE ENCOUNTER
To Dr Eboni SANCHEZ-I left message to call back for [address] [employer] to get more information from her    Also--ok for order for ?

## 2017-03-14 NOTE — TELEPHONE ENCOUNTER
Okay for social service. I should probably see her when she is able to make it here for office visit. She may be staying with her son Yvonne.

## 2017-03-15 NOTE — TELEPHONE ENCOUNTER
Called Maren Rey form [facility] and relayed DR. MAY message . She verbalized understanding and states that  can arrange a ride for an office visit once she saw patient .  Patient has no one who can bring her

## 2017-03-27 NOTE — PROGRESS NOTES
HPI:   Anthony Sloan is a 80year old female presents with the following problems. Patient looks and feels well. Even though she is 73 pounds she does not look ill. Patient states she feels well. She has no acute complaints.     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  No acute distress.    EYES ;  denies blurred vision or eye pain  HEENT:  denies nasal congestion, sinus pain or sore throat  LUNGS:  denies shortness of breath or cough  CARDIOVASCULAR:  denies chest pain or palpitations  GI:  denies abdominal pain, unspecified  Update labs. 4. History of left breast cancer  Asymptomatic. Exam benign. 5. Paroxysmal atrial fibrillation [facility])  Patient with history of paroxysmal atrial fibrillation. Felt to be now in normal sinus rhythm. On multiecho.   Eliquis h

## 2017-03-30 NOTE — TELEPHONE ENCOUNTER
Please call [facility], requesting discharge orders for pt from [facility] Joey Arceo  Tasked to nursing

## 2017-03-30 NOTE — TELEPHONE ENCOUNTER
Relayed MD's message to Jackie---verbalized understanding.  Oleg Carter states pt lives with son whom is aware and agreeable to d/c [facility] was they are awaiting to move pt to assisted living    To Dr. Anai Sen

## 2017-03-30 NOTE — TELEPHONE ENCOUNTER
Please ask home health care to discuss with patient's sons regarding discharge from home health care. They may want to continue it. If they are unable to continue care please asked them to notify family members so that there is no confusion.

## 2017-04-07 NOTE — TELEPHONE ENCOUNTER
Pt needs a refill Carbidopa - Levodopa  mg, pt is low on medication     Send to [facility]    Tasked to rx low

## 2017-04-12 NOTE — TELEPHONE ENCOUNTER
Request denied as it was refilled at [facility] in [facility] on 4/7/17 per patients son request, [facility] in  notified.

## 2017-04-28 NOTE — TELEPHONE ENCOUNTER
Ong requesting NEW RX for Carbidopa Levodopa, pt transferring RX  from [facility] to [facility] is low on medication  Tasked to [employer]

## 2017-05-08 NOTE — TELEPHONE ENCOUNTER
Called and spoke with son Ashvin Yates. Stated he picked up the RX at [place], but would like future medications to be sent to [place] in [facility] is now selected as preferred pharmacy.

## 2017-07-09 NOTE — TELEPHONE ENCOUNTER
Called patients son and relayed DR. MAY message and he verbalized understanding
Juan faxed a refill request for: Lisinopril 20 mg tab, Furosemide 40 mg  Fax.  #  [phone]    Routed to Rx
LMTCB   Need to clarify dosages and if patient is still on these meds  Called son who will call back with dosages of Lisinopril and Lasix
No answer, unable to leave VM. Nursing to try calling pt later.  Lasix eRx sent
Please advise on refills - son called with information . Patient is taking lisinopril 20 mg BID - med module once daily) and furosemide 40 mg TU TH SA GIVENS and 60 mg MO-WE-FR to DR. MAY
Please let patient know that I would like mother only on lisinopril 20 mg a day. Only 1 a day. Not twice a day. This medicine can sometimes be hard on the kidneys as we get older. Okay to refill the furosemide as requested.
Son called back - patient needs refill on Lisinopril - RX sent
No

## 2017-09-21 NOTE — TELEPHONE ENCOUNTER
Pt son called, requesting refills for:  Albuterol  Multaq    Pt is out of Albuterol    Pt uses [facility], [place]    Tasked to [employer]

## 2017-10-12 NOTE — PROGRESS NOTES
HPI:   Junior Esparza is a 80year old female presents with the following problems. For 2 weeks patient has intermittent upper abdominal pain after eating. Vomited once 2 days ago. Patient feels she is constipated. Not moving her bowels well.   Theresa Aguero Rfl: 3   furosemide 40 MG Oral Tab Take 1 tablet on Sunday Tuesday Thursday and Saturday. Take 1.5 tablets Monday Wednesday and Friday. Disp: 102 tablet Rfl: 1   dronedarone HCl (MULTAQ) 400 MG Oral Tab Take 400 mg by mouth 2 (two) times daily with meals. 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 EYES ;  denies blurred vision or eye pain  HEENT:  denies nasal congestion, sinus pain or sore throat  LUNGS: Some shortness of breath with activity percent. Nothing acute. No chest pain. DIFFERENTIAL WITH PLATELET; Future  - COMP METABOLIC PANEL (14); Future  - XR ABDOMEN, OBSTRUCTIVE SERIES (CPT=74020); Future    2. Pain of upper abdomen  Benign exam now. Continue Protonix. Check x-ray.     3. Constipation, unspecified constipation type

## 2017-10-13 NOTE — TELEPHONE ENCOUNTER
Left message with patient's son Yvonne who was with patient yesterday during office visit. Related that x-rays did not show any obstruction. CBC shows anemia. Chemistries show maybe a little bit of decreased GFR.   Patient on Lasix 1 tablet a day with 1-1/

## 2017-10-16 NOTE — TELEPHONE ENCOUNTER
[phone], son  He received Dr Burt Najera call about his mom. Thanks  He also was hoping Dr Burt Najera could fill a medication that DR Kai Ronquillo normally refills. Pt needs to schedule appt w/ DR Kai Ronquillo soon but will be out of meds soon.   To Rx

## 2017-10-23 PROBLEM — R53.1 WEAKNESS GENERALIZED: Status: ACTIVE | Noted: 2017-01-01

## 2017-10-23 PROBLEM — I95.9 HYPOTENSION: Status: ACTIVE | Noted: 2017-01-01

## 2017-10-23 PROBLEM — E86.0 DEHYDRATION: Status: ACTIVE | Noted: 2017-01-01

## 2017-10-23 NOTE — TELEPHONE ENCOUNTER
Pepe Keyur is calling because his mom experienced a racing heart this morning. He checked her pulse & it is irregular. Pt. resumed taking Multak a heart medication.  Please advise  Ph. # [phone]   Routed to clinical

## 2017-10-23 NOTE — ED NOTES
Dr. Maria Ines Phillips made aware, unable to get pulse oximetry to read after trying ears, forehead, toes and fingers.

## 2017-10-23 NOTE — TELEPHONE ENCOUNTER
Per patients son, patient said she was not feeling well when she woke up this morning, stated that her heart was racing. Son states he checked her pulse and it was irregular.  Patient now resting and son checking her pulse occassionally and it has \"slowed

## 2017-10-23 NOTE — TELEPHONE ENCOUNTER
Called son Ruben Merritt and relayed Dr Kirill Meredith message to him. He verbalized understanding and will take her to the ER.

## 2017-10-23 NOTE — ED NOTES
Patient complains of generalized weakness that started earlier this morning. + SOB. Denies CP. Son at bedside.  Per son, patient usually gets up in the morning and is able to make herself breakfast and get coffee, but today she didn't have the strength to d

## 2017-10-24 NOTE — PHYSICAL THERAPY NOTE
PHYSICAL THERAPY EVALUATION - INPATIENT     Room Number: 465/465-A  Evaluation Date: 10/24/2017  Type of Evaluation: Initial   Physician Order: PT Eval and Treat    Presenting Problem: dehydration, weight loss, poor appetite, weakness  Reason for LY VICK [facility] MEDICAL/SOCIAL HISTORY     History related to current admission: Has been to [facility] 4x before for rehab. Hx of CHF, afib, COPD and restless leg. She reports she fell once at home one year ago when trying to get out of bed.      Problem List  Principal Pr History:  2012: BOWEL RESECTION      Comment: small bowel resection  2002, 2008: CATARACT EXTRACTION W/  INTRAOCULAR LENS IMPLA*  No date: CATH PERCUTANEOUS  TRANSLUMINAL CORONARY ANGIO*      Comment: PTCA and RICO of the left main  2013: CATH TRANSCATHETER '6-Clicks' INPATIENT SHORT FORM - BASIC MOBILITY  How much difficulty does the patient currently have. ..  -   Turning over in bed (including adjusting bedclothes, sheets and blankets)?: A Little   -   Sitting down on and standing up from a chair with arms to patient in preparation for discharge.    Goal #5   Current Status    Goal #6    Goal #6  Current Status

## 2017-10-24 NOTE — CM/SW NOTE
SW following up on d/c planning for the patient. Per report and chart review, family would like SNF placement at [facility] is recommended. Referral sent and DON screen requested.  PT/OT pending at this time and SW will assist with d/c planning pending recommend

## 2017-10-24 NOTE — H&P
[facility]    PATIENT'S NAME: Alexander Tom   ATTENDING PHYSICIAN: Henrique Nino MD   PATIENT ACCOUNT#:   [id number]    LOCATION:  [place] [address]  MEDICAL RECORD #:   [id number]       YOB: 1921  ADMISSION DATE:       10/ is not able to give further details. SOCIAL HISTORY:  She lives with her son. No tobacco, alcohol, or drug use. She uses a cane or a walker to ambulate around, but lately she has been requiring assistance with her mobility.     REVIEW OF SYSTEMS:  The 19:14:48  t: 10/23/2017 20:45:23  [facility] [id number]  FB/

## 2017-10-24 NOTE — ED PROVIDER NOTES
Patient Seen in: [facility] Emergency Department    History   Patient presents with:  Fatigue (constitutional, neurologic)    Stated Complaint: weakness    HPI    presents from home with her son.   She has had fatigue today more tiredness has been inappropriate ADH production) ([facility] [address]    • Small bowel obstruction     small bowel resection  2012   • Status post coronary artery stent placement     Patient had left main stent placed at the time of her TAVR   • Stented coronary artery    • Subconjunctival Each Nare route daily. Omega-3 Fatty Acids (FISH OIL) 1000 MG Oral Capsule Delayed Release,  One capsule daily   Multiple Vitamin (STRESS FORMULA 500/BIOTIN) Oral Tab,  Take 1 tablet by mouth.    acetaminophen (TYLENOL) 325 MG Oral Tab,  Take 650 mg by mo appear normal, no lesions. Cardiovascular:  Normal S1 and S2, no murmur, regular, with good peripheral perfusion. Respiratory:  Lungs clear to auscultation bilaterally with good effort. No wheezes, ronchi, or rales.   Abdomen:  Soft, non-tender, non-dist Please view results for these tests on the individual orders. RAINBOW DRAW LAVENDER   RAINBOW DRAW DARK GREEN   RAINBOW DRAW LIGHT GREEN   RAINBOW DRAW LAVENDER TALL (BNP)     EKG    Rate, intervals and axes as noted on EKG Report.   Rate: Jade

## 2017-10-24 NOTE — CM/SW NOTE
Met w/patient and son/Jose David for placement plan if required. Patient has been at [facility] approximately four times per son. Provided with SNF listing to son to review however, if needs placement most likely will be [facility] per patient and son.

## 2017-10-24 NOTE — DIETARY NOTE
ADULT NUTRITION INITIAL ASSESSMENT    Pt is at high nutrition risk. Pt meets malnutrition criteria.       CRITERIA FOR MALNUTRITION DIAGNOSIS:  Criteria for severe malnutrition diagnosis: chronic illness related to wt loss greater than 10% in 6 months, rel (CHF) ([facility]    • Gastric volvulus 2006    repaired by Dr Marisa Metcalf    hernia repair   • Hyperlipidemia    • Hypertension    • Ischemic vascular disease 2006    small vessel ischemic vascular disease by MRI of brain   • Paroxysmal atrial fibrillation ([facility] sunrise sandwich and working on oatmeal with brown sugar. Intake Meeting Needs: can meet needs with good intake of supplements.       Food Allergies: No  Cultural/Ethnic/Holiness Preferences: no    MEDICATIONS: reviewed  • aspirin  81 mg Oral Daily

## 2017-10-24 NOTE — OCCUPATIONAL THERAPY NOTE
OCCUPATIONAL THERAPY EVALUATION - INPATIENT     Room Number: 465/465-A  Evaluation Date: 10/24/2017  Type of Evaluation: Initial  Presenting Problem:  (dehydration, inability to ambulate)    Physician Order: IP Consult to Occupational Therapy  Reason for T volvulus 2006    repaired by Dr Clotilde Carranza   • Hepatic cyst    • Hernia, abdominal     hernia repair   • History of asthma    • Hyperlipidemia    • Hypertension    • Hypotropia 2008   • Iritis 2002   • Ischemic vascular disease 2006    small vessel ischemic basic self care and household mobility mod I. Pt lives with son.      SUBJECTIVE  \"I need my inhaler\"      OCCUPATIONAL THERAPY EXAMINATION      OBJECTIVE     Fall Risk: High fall risk    PAIN ASSESSMENT  Ratin          ACTIVITY TOLERANCE  Good for 20 Comment:     Patient will tolerate standing for 7 minutes in prep for adls with SBA  Comment:     Comment:            Goals  on: 10/31/17  Frequency: 3x/week

## 2017-10-24 NOTE — PLAN OF CARE
Problem: Patient/Family Goals  Goal: Patient/Family Long Term Goal  Patient's Long Term Goal: return home with son    Interventions:  -   - See additional Care Plan goals for specific interventions    Outcome: Progressing  Plan of care discussed with betsy responsible for managing their own health  - Refer to Case Management Department for coordinating discharge planning if the patient needs post-hospital services based on physician/LIP order or complex needs related to functional status, cognitive ability o

## 2017-10-24 NOTE — SLP NOTE
ADULT SWALLOWING EVALUATION    ASSESSMENT    ASSESSMENT/OVERALL IMPRESSION:  Pt seen sitting upright in bed for all PO trials for BSSE. Pt with known hx of dysphagia with VFSS completed 2015.  At this time, pt with laryngeal penetration without aspiration n Recommendations/Plan: Undetermined    HISTORY   MEDICAL HISTORY  Reason for Referral: R/O aspiration    Problem List  Principal Problem:    Dehydration  Active Problems:    Weakness generalized    Hypotension      Past Medical History  Past Medical History \"chopper\")  Precautions: Aspiration    Patient/Family Goals: \"I'm hungry\"    SWALLOWING HISTORY  Current Diet Consistency: NPO  Dysphagia History: Known hx of dysphagia with completion of VFSS in 2015.  At this time, pt with no aspiration observed on VF evaluation) including Slow rate, Small bites, Small sips, Multiple swallows, No straws, Upright 90 degrees, Eliminate distractions, Supervision with meals with mild assistance 100 % of the time across 2 sessions.   In Progress     FOLLOW UP  Treatment Plan/

## 2017-10-24 NOTE — PROGRESS NOTES
[place] [facility]    Progress Note    [address] [facility] Patient Status:  Inpatient    10/2/1921 MRN [id number]   Location [facility] 4W/SW/SE Attending Jacque Salgado MD   [facility] Day # 1 PCP Jayden Palafox MD     Subjective:   Mckenzie Rios antihypertensives at this time. GI ppx: PPi daily  DVT ppx: will start the patient on heparin subq 5000 U BID, monitor hgb levels    Dispo: continue PT/OT, IV hydration, encourage PO intake, monitor hgb levels,   Will continue to monitor.      Greater th

## 2017-10-25 NOTE — OCCUPATIONAL THERAPY NOTE
OCCUPATIONAL THERAPY TREATMENT NOTE - INPATIENT     Room Number: 465/465-A          Presenting Problem:  (dehydration, inability to ambulate)    Problem List  Principal Problem:    Dehydration  Active Problems:    Weakness generalized    Hypotension      A 40.22  CMS Modifier (G-Code): CK    FUNCTIONAL TRANSFER ASSESSMENT  Supine to Sit : Not tested  Sit to Stand: Supervision  Toilet Transfer: supervision with grab bar for support.    Shower Transfer: NT  Chair Transfer: supervision    Bedroom Mobility: pt to

## 2017-10-25 NOTE — PHYSICAL THERAPY NOTE
PHYSICAL THERAPY TREATMENT NOTE - INPATIENT    Room Number: 465/465-A       Presenting Problem: dehydration, weight loss, poor appetite, weakness    Problem List  Principal Problem:    Dehydration  Active Problems:    Weakness generalized    Hypotension [place]    AM-PAC Score:  Raw Score: 18   PT Approx Degree of Impairment Score: 46.58%   Standardized Score (AM-PAC Scale): 43.63   CMS Modifier (G-Code): CK    FUNCTIONAL ABILITY STATUS  Gait Assessment   Gait Assistance: Minimum assistance  Distance (ft):

## 2017-10-25 NOTE — PAYOR COMM NOTE
--------------  ADMISSION REVIEW     Payor: Ronnie Morgan #:  [id number]  Authorization Number: N/A    Admit date: 10/23/17  Admit time: 2237       Admitting Physician: Maura Nowak MD  Attending Physician:  Meliton Draper MD  Primary Care Handy   • Hepatic cyst    • Hernia, abdominal     hernia repair   • History of asthma    • Hyperlipidemia    • Hypertension    • Hypotropia 2008   • Iritis 2002   • Ischemic vascular disease 2006    small vessel ischemic vascular disease by MRI of [facility] carbidopa-levodopa  MG Oral Tab,  TAKE ONE TABLET BY MOUTH ONE TO THREE TIMES A DAY FOR RESTLESS LEG SYNDROME   lisinopril 20 MG Oral Tab,  Take 1 tablet (20 mg total) by mouth daily.    furosemide 40 MG Oral Tab,  Take 1 tablet on Sunday Tuesday [facility] above.    PSFH elements reviewed from today and agreed except as otherwise stated in HPI.     Physical Exam[MH.1]   ED Triage Vitals  BP: 101/50 [10/23/17 1812]  Pulse: 109 [10/23/17 1812]  Resp: 24 [10/23/17 1812]  Temp: 97.6 °F (36.4 °C) [10/23/17 1812] Ratio 36.4 (*)     GFR, Non- 42 (*)     GFR, -American 51 (*)     All other components within normal limits   CBC W/ DIFFERENTIAL - Abnormal; Notable for the following:     HGB 9.9 (*)     HCT 30.8 (*)     MCH 26.7 (*)     RDW 16.8 ( Attribution Rios     MH. 1 - Chace Agee MD on 10/23/2017  7:01 PM   MH. 2 - Chace Agee MD on 10/23/2017  8:24 PM                       H&P - H&P Note      H&P signed by Janel Bowman MD at 10/24/2017  4:05 PM      Author:  Janel Bowman hypertension; large ventral abdominal hernia; gallstone pancreatitis; dyslipidemia; bilateral carotid artery stenosis.       PAST SURGICAL HISTORY:  Cholecystectomy, ventral hernia repair with recurrent small bowel obstruction requiring partial small bowel loss.  3.   Chronic atrial fibrillation. PLAN:  The patient will be admitted to general medical floor. Remote telemetry. IV fluids. Followup on urinalysis and culture. Obtain TSH. Monitor her kidney function and hemodynamic status closely.   So far, Pantoprazole Sodium (PROTONIX) EC tab 40 mg     Date Action Dose Route User    10/25/2017 0846 Given 40 mg Oral Marcos Haskins, TRUMAN      potassium chloride (K-SOL) 40 meq/30 ml (10%) oral solution 40 mEq     Date Action Dose Route User    10/25/2017 1158 lisinopril in the setting of hypotension.   - monitor hemodynamic status.      Progressive weigh tloss  - likely in the setting of poor po Intake  - continue to monitor.     Persistent Afib  - currently patient is not on AC.    - will continue ASA 81     Hx two midnight's based on the clinical documentation in H+P. Based on patients current state of illness, I anticipate that, after discharge, patient will require TBD.            Sabas Noble MD  10/24/2017      SW following up on d/c planning for the pat Unknown  Iodoform                Unknown  Potassium Iodide        Unknown  Sodium Iodide           Unknown    Prescriptions Prior to Admission      Prescriptions Prior to Admission:  dronedarone HCl (MULTAQ) 400 MG Oral Tab Take 1 tablet (400 mg total) by Physical Exam  Awake alert oriented answering questions appropriately  No jugular venous distention  Lungs were clear but decreased breath sounds, few crackles at the right base, no wheezing  Cardiovascular exam S1-S2 irregular irregular no S3 systolic m moderate tricuspid regurgitation  Follow-up with 2D echo Doppler     CHF- crackles on exam, interstitial edema CXR  Decrease IV fluids  Patient is continued on her diuretic  F/U ECHO/doppler     Anemia with decreasing H&H  Further workup per PCP  Rule out consult, appreciate recs.    - started on Toprol XL 25 mg PO qdaily  - patient has been on multaq, which has been discontinued by cards  - obtain ECHO to evaluate for hs of AS, MR, TR  - not an AC due to anemia.     CHF   - patient initially was hypotensive

## 2017-10-25 NOTE — HISTORICAL OFFICE NOTE
Марина Gamino  : 10/02/1921  ACCOUNT:  [id number]  [id number]  PCP: Dr. Velvet Felipe     TODAY'S DATE: 10/22/2014  DICTATED BY:  Rakan Lott M.D. ]    CHIEF COMPLAINT: [Followup of .  CAD, of native vessels, Followup of Benign Essential Hypertensio mastectomy. GI: soft, nontender. MS: adequate gait for exercise testing/testing. EXT: no clubbing or cyanosis. SKIN: no rashes, lesions, ulcers and left lower chest 2cm raised sebaceous intact cyst with pale pink edges.   NEURO/PSYCH: alert, oriented to ti 0.31MG/3  as needed                                06/01/11 Protonix              40MG      1 daily                                  06/02/10 Zocor                 20MG      1 daily at bedtime                       06/02/10 Multivitamins

## 2017-10-25 NOTE — PROGRESS NOTES
[place] [facility] - [facility]    Progress Note    [address] [facility] Patient Status:  Inpatient    10/2/1921 MRN [id number]   Location [facility] 4W/SW/SE Attending Brisa Magana MD   [facility] Day # 2 PCP Tammy Castleman, MD     Subjective:   Nathalia Marques anemia. CHF   - patient initially was hypotensive and dehydrated  - Fluids have been decreased  - decrease IVF, continue home dose lasix. Progressive weight loss  - likely in the setting of poor po Intake  - continue to monitor.     Hx of Severe AS

## 2017-10-25 NOTE — CONSULTS
[facility]    Report of Consultation    Tova Underwood Patient Status:  Inpatient    10/2/1921 MRN [id number]   Location [facility] 4W/SW/SE Attending Denny Daniel MD   Hosp Day # 2 PCP Lizbeth Chau MD     Date of Admi • Carotid artery disease ([facility]    • Carotid stenosis 2006   • Cataract 1999    PHACO w/ PCIOL  2002, 2008   • Cholelithiasis    • Congestive heart failure (CHF) ([facility]    • Gallstones    • Gastric volvulus 2006    repaired by Dr Roger Rene   • Hepatic cyst Age of Onset   • Other[other] [OTHER] Mother      influenza- cause of death   • Other[other] [OTHER] Brother      passed away of \"mastoid\"   • [place] Sister 15      frail   • Diabetes Neg    • Glaucoma Neg    • Macular degeneration Neg of Systems  Positives as stated above including weakness lack of appetite weight loss periods of feeling anxious no chest pains chronic COPD breathing's been stable no PND no orthopnea no lower extremity edema no fevers chills or cough no GI or urinary sym Anteroseptal -lateral infarct (age undetermined) ABNORMAL When compared with ECG of 01/03/2017 17:13:40 no change Electronically signed on 10/24/2017 at 13:22 by Cornelio Moreno MD      Impression:   Dehydration/patient has been treated weakness generalized

## 2017-10-25 NOTE — CM/SW NOTE
ZEINAB following up on d/c planning for the patient. ZEINAB spoke with the patient's son re possible d/c today to [facility] pending cardiology. He is in agreement with the transfer via medicar around 3pm today.   Therapy is recommending 24/7 sup

## 2017-10-25 NOTE — SLP NOTE
Attempted to see patient, however, the patient refused. Collaborated with RN who reported no issues with swallowing and no clinical signs of aspiration. Patient takes medication with water without clinical sings of aspiration.   Will follow up with patien

## 2017-10-26 NOTE — PLAN OF CARE
Patient/Family Goals    • Patient/Family Long Term Goal Not Progressing          Altered Communication/Language Barrier    • Patient/Family is able to understand and participate in their care Progressing        DISCHARGE PLANNING    • Discharge to home or

## 2017-10-26 NOTE — PLAN OF CARE
Report given to [facility] staff. Pt discharged via [facility] at 1500.

## 2017-10-26 NOTE — PROGRESS NOTES
[place] [facility] - [facility]    Progress Note    [address] [facility] Patient Status:  Inpatient    10/2/1921 MRN [id number]   Location [facility] 4W/SW/SE Attending Brenda Boston MD   Hosp Day # 3 PCP Adams Kelley MD       Assessment and Plan: oriented x3,   Neck:supple,no JVD  Pulm: Lungs crackles bilaterally, normal respiratory effort  CV: Heart with irregular rate and rhythm, nl Y0,Q3 , systolic murmur  Abd: Abdomen soft, slightly tender to palpation  Ext:  no clubbing, no cyanosis,no edema

## 2017-10-26 NOTE — PLAN OF CARE
RN notified pt's son Sarai Partida regarding pt discharge to [facility] at 3pm this afternoon.

## 2017-10-26 NOTE — CM/SW NOTE
ZEINAB following up on d/c planning for the patient. Per report, possible d/c today to [facility]. SW will arrange transfer when d/c order received, a bed is available. 11:30am: Patient is stable for d/c, RN to notify son.  ZEINAB spoke with

## 2017-10-26 NOTE — PHYSICAL THERAPY NOTE
PHYSICAL THERAPY TREATMENT NOTE - INPATIENT    Room Number: 465/465-A       Presenting Problem: dehydration, weight loss, poor appetite, weakness    Problem List  Principal Problem:    Dehydration  Active Problems:    Weakness generalized    Hypotension wheelchair)?: A Little   -   Need to walk in hospital room?: A Little   -   Climbing 3-5 steps with a railing?: A Little    AM-PAC Score:  Raw Score: 18   PT Approx Degree of Impairment Score: 46.58%   Standardized Score (AM-PAC Scale): 43.63   CMS Modifie

## 2017-10-26 NOTE — SLP NOTE
SPEECH DAILY NOTE - INPATIENT    ASSESSMENT & PLAN   ASSESSMENT  Pt seen for swallowing therapy to monitor swallowing tolerance and train swallowing precautions. Pt seen with limited trials of 1 bite of soft solid and 2 sips of water from an open cup.   Pt increased in duration secondary to mild loose fit of the pt's dentures. In Progress   Goal #2 The patient/family/caregiver will demonstrate understanding and implementation of aspiration precautions and swallow strategies independently over 2 session(s).

## 2017-10-27 NOTE — DISCHARGE SUMMARY
[facility] HOSPITALIST  DISCHARGE SUMMARY     Hillary Underwood Patient Status:  Inpatient    10/2/1921 MRN [id number]   Location [facility] 4W/SW/SE Attending No att. providers found   Hosp Day # 3 PCP Gayla Orozco MD     DATE OF ADMISSION: 10/ not attempted because she came in dehydrated. She will have a BMP as an outpatient. She will follow-up with a heart failure clinic.   We have asked the nurse practitioner to follow her at the skilled nursing facility to mitigate any circumstances which ma aspirin      1 TABLET DAILY   Refills:  0     carbidopa-levodopa  MG Tabs  Commonly known as:  SINEMET  Notes to patient:  Continue home schedule      TAKE ONE TABLET BY MOUTH ONE TO THREE TIMES A DAY FOR RESTLESS LEG SYNDROME   Quantity:  90 tablet Hospital:  Congestive Heart Failure    Please note that only patients enrolled in the Medicare ACO, [facility] ACO and [address] [place] [address] will be handled by a member of the Care Management Team.  For all other patients, please follow usual protocol for discharge care tra

## 2017-10-27 NOTE — PAYOR COMM NOTE
--------------  DISCHARGE REVIEW    Payor: Aurelia Doherty #:  [id number]  Authorization Number: N/A    Admit date: 10/23/17  Admit time:  2237  Discharge Date: 10/26/2017  3:00 PM

## 2017-10-30 NOTE — PROGRESS NOTES
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 Afib wit RVR - cards hade seen pt will follow at the clinic    - Metoprolol increased to 5mg PO qd today by cardiologist   - patient has been on multaq, which has been discontinued by cards    - echo done in hospital   - not on [facility] due to anemia   - ?

## 2017-11-01 NOTE — PROGRESS NOTES
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 1. Hypovolemia, hypotension and dehydration.    - improved, patient has been tolerating PO diet.     - hypotension imroved   - monitor for fluid overload   - continue home dose lasix    - hold lisinopril in the s

## 2017-11-01 NOTE — TELEPHONE ENCOUNTER
Pt's son, Ruben Merritt,  would like to speak with you re his mom's condition    Please call Ruben Merritt [phone]

## 2017-11-02 PROBLEM — N17.9 ACUTE KIDNEY INJURY (HCC): Status: ACTIVE | Noted: 2017-01-01

## 2017-11-02 PROBLEM — E87.20 METABOLIC ACIDOSIS: Status: ACTIVE | Noted: 2017-01-01

## 2017-11-02 PROBLEM — N39.0 URINARY TRACT INFECTION WITH HEMATURIA, SITE UNSPECIFIED: Status: ACTIVE | Noted: 2017-01-01

## 2017-11-02 PROBLEM — R79.89 AZOTEMIA: Status: ACTIVE | Noted: 2017-01-01

## 2017-11-02 PROBLEM — E87.2 METABOLIC ACIDOSIS: Status: ACTIVE | Noted: 2017-01-01

## 2017-11-02 PROBLEM — I50.9 ACUTE ON CHRONIC CONGESTIVE HEART FAILURE, UNSPECIFIED CONGESTIVE HEART FAILURE TYPE: Status: ACTIVE | Noted: 2017-01-01

## 2017-11-02 PROBLEM — Z71.89 GOALS OF CARE, COUNSELING/DISCUSSION: Status: ACTIVE | Noted: 2017-01-01

## 2017-11-02 PROBLEM — R73.9 HYPERGLYCEMIA: Status: ACTIVE | Noted: 2017-01-01

## 2017-11-02 PROBLEM — R31.9 URINARY TRACT INFECTION WITH HEMATURIA: Status: ACTIVE | Noted: 2017-01-01

## 2017-11-02 PROBLEM — N39.0 URINARY TRACT INFECTION WITH HEMATURIA: Status: ACTIVE | Noted: 2017-01-01

## 2017-11-02 PROBLEM — R31.9 URINARY TRACT INFECTION WITH HEMATURIA, SITE UNSPECIFIED: Status: ACTIVE | Noted: 2017-01-01

## 2017-11-02 NOTE — PROGRESS NOTES
Castañeda Loss : 10/2/1921 Age 80year old female patient is admitted to [facility] for KRISTINA        Chief complaint: SOB, tachycardia, weakness f/u CHF, decreased appetite & report weight loss        This is a 79 yo female w/ pa [facility])    • Gallstones    • Gastric volvulus 2006     repaired by Dr Adenike Malik    • Hepatic cyst    • Hernia, abdominal     hernia repair    • History of asthma    • Hyperlipidemia    • Hypertension    • Hypotropia 2008    • Iritis 2002    • Ischemic vascul away of \"mastoid\"    • P[place]y [place]gu[place] Sister 15      frail    • Diabetes Neg    • Glaucoma Neg    • Macular degeneration Neg        Smoking status: Never Smoker        Smokeless tobacco: Never Used    Alcohol use: Yes     Comment: 3 drinks/year and son. Pt received extra 20mg Lasix yesterday 2/2 weight gain +9 lbs and 1L NS at 75ml/hr for BUN 58 and Crt 1.5. Palliative consult pending eval, according to SW pt would be seen either today or tomorrow.             1. Hypovolemia, hypotension and

## 2017-11-02 NOTE — ED INITIAL ASSESSMENT (HPI)
Pt arrived via medics to rm 46 with 24 left f/a sl for complaint of sob, per medics pts 140 related to rapid afib, pt non compliant with medication, medics administered 5mg veraparil and heart decreased to 80 on arrival heart rate 86 afib, pt denies compla

## 2017-11-02 NOTE — CM/SW NOTE
Met with son Marcelina Gusman in regards to discharge planning. He is waiting for his brother to come tomorrow so they can make decisions in regards to palliative vs Hospice. Patient was due to have an eval for Palliative at [facility] in the next 24 hrs.  A Palliative cons

## 2017-11-02 NOTE — CONSULTS
[facility] Patient Status:  Emergency    10/2/1921 MRN [id number]   Location [address] Attending Maura Nowak MD   Hosp Day # 0 PCP Lauro Workman MD doesn't wear O2 at home. No signs of respiratory distress. She denies cough. Patient denies any current pain issues. Appetite has been poor for some time, current BMI is 15, weight is 77 pounds.  Pt's son tells me pt has lost wt r/t poor po intake and her d mastectomy   • [facility]    • Carotid stenosis 2006   • Cataract 1999    PHACO w/ PCIOL  2002, 2008   • Cholelithiasis    • Congestive heart failure (CHF) ([facility]    • Gallstones    • Gastric volvulus 2006    repaired by Dr Dago Goss Social History:   Marital Status:   Children: 2 sons, 1 step dtr  Living Situation Prior to Admit: [facility] rehab  Does Patient Live Alone: no  Is Patient Confused: no  Occupational History: Retired, LPN    Substance History:  Smoking Status: non-smoker  H 10.2 (L) 11/02/2017   HCT 31.6 (L) 11/02/2017    11/02/2017       Coags:  Lab Results  Component Value Date   PT 16.6 (H) 01/15/2013   INR 1.4 (H) 01/03/2017   PTT 29.6 01/03/2017       Chemistry:  Lab Results  Component Value Date   CREATSERUM 1.51 preference about sharing medical information: speak to pt and son  Patient's decision making preferences: speak to pt and sons  Code status: DNR  Have advanced directives been discussed with patient or healthcare power of : Yes  Advance Directive: to follow clinically.     Thank you for allowing Palliative Care services to participate in the care of . Stew JOCELYNE Stern, [facility] [id number]  11/2/2017  3:44 PM

## 2017-11-02 NOTE — PROGRESS NOTES
11/02/17 1644   Clinical Encounter Type   Visited With Patient   Routine Visit Introduction  ([facility])   Continue Visiting Yes   Episcopalian Encounters   Spiritual Requests During Visit / Hospitalization Taoist Communion   Sacramental Enc

## 2017-11-02 NOTE — H&P
[facility]    PATIENT'S NAME: Jacobo Emma   ATTENDING PHYSICIAN: Jj Nichols MD   PATIENT ACCOUNT#:   [id number]    LOCATION:  [place] [address]  MEDICAL RECORD #:   [id number]       YOB: 1921  ADMISSION DATE:       6 coronary artery PCI stent, chronically elevated troponin, restless leg syndrome, hypertension, but recently has been hypotensive. She had large ventral abdominal hernia, gallstone pancreatitis, dyslipidemia, bilateral carotid artery stenosis.       PAST GIVENS congestion, considering her elevated liver function tests. 3.   Hypovolemia. 4.   Acute renal failure. 5.   Metabolic acidosis, possibly related to her hypovolemia. PLAN:  Patient will be admitted to general medical floor.   IV Rocephin, gentle hydrat

## 2017-11-03 NOTE — ED PROVIDER NOTES
Patient Seen in: [facility] 3w/sw    History   Patient presents with:  Dyspnea DOYLE SOB (respiratory)    Stated Complaint: sob    HPI    Patient complains of sob and dysuria. Hx chf. Notes this morning she felt sob but now feeling better.   Compla DRUG ELUTING STENT  No date: CATH PERCUTANEOUS  TRANSLUMINAL CORONARY ANGIO*      Comment: PTCA and RICO of the left main  2013: CATH TRANSCATHETER AORTIC VALVE REPLACEMENT  2/1/12: ELECTROCARDIOGRAM, COMPLETE      Comment: scanned to media tab  3/6/12: CHER (FLONASE) 50 MCG/ACT Nasal Suspension,  1 spray by Each Nare route daily. acetaminophen (TYLENOL) 325 MG Oral Tab,  Take 650 mg by mouth every 6 (six) hours as needed for Pain.    BABY ASPIRIN 81 MG OR CHEW,  81 mg by mouth daily       Family History   Pr with dyspnea likely due to chf with sig valvular heart disease    ED Course     Labs Reviewed   BASIC METABOLIC PANEL (8) - Abnormal; Notable for the following:        Result Value    Glucose 117 (*)     CO2 17 (*)     BUN 60 (*)     Creatinine 1.51 (*) -----------         ------                     CBC W/ DIFFERENTIAL[[id number]]          Abnormal            Final result                 Please view results for these tests on the individual orders.    BASIC METABOLIC PANEL (8)   CBC WITH DIFFERENTIAL WITH P Yes    Acute on chronic congestive heart failure, unspecified congestive heart failure type ([facility] I50.9 11/2/2017     Azotemia R79.89 11/2/2017 Yes    Goals of care, counseling/discussion Z71.89 11/2/2017 Unknown    Hyperglycemia R73.9 11/2/2017 Yes    Met

## 2017-11-03 NOTE — DISCHARGE SUMMARY
[facility]    PATIENT'S NAME: [facility]   ATTENDING PHYSICIAN: Silver Porter MD   PATIENT ACCOUNT#:   [id number]    LOCATION:  [address] [place] [address] RECORD #:   [id number]       YOB: 1921  ADMISSION DATE:       11/02/ .     Dictated By Lul Limon MD  d: 2017 15:00:34  t: 2017 15:52:26  Job [id number]  FB/

## 2017-11-03 NOTE — PROGRESS NOTES
Patient observed with agonal breathing at approx. 6:45 AM. Patient positioned for optimal comfort. PCTs aware- assisting in comfort measures. Family notified and arriving here promptly. Hospitalist paged and notified of situation- new orders entered.  Respi

## 2017-11-03 NOTE — PAYOR COMM NOTE
PLEASE NOTE PATIENT  11/3    ADMISSION REVIEW     Payor: Aurelia Bessy #:  [id number]  Authorization Number: N/A    Admit date: 17  Admit time: 36       Admitting Physician: Latesha Farfan MD  Attending Physician:  Denise \"modertely to severly calcified area of stenosis\"   • Pneumonia due to organism    • Restless leg syndrome    • S/P TAVR (transcatheter aortic valve replacement) 2013   • SIADH (syndrome of inappropriate ADH production) ([facility])    • Small bowel obstructio PROAIR  (90 Base) MCG/ACT Inhalation Aero Soln,  INHALE 2 PUFFS BY MOUTH EVERY 6 HOURS AS NEEDED FOR WHEEZING   carbidopa-levodopa  MG Oral Tab,  TAKE ONE TABLET BY MOUTH ONE TO THREE TIMES A DAY FOR RESTLESS LEG SYNDROME  Patient taking diff Current:/70 (BP Location: Left arm)   Pulse 98   Temp 98.6 °F (37 °C) (Oral)   Resp 22   Ht 149.9 cm (4' 11\")   Wt 33.7 kg   SpO2 96%   BMI 15.03 kg/m²     PULSE OX Nl on room air    GENERAL: awake appears cachectic, mild resp distress  HEAD: normoc All other components within normal limits   BNP (B TYPE NATRIUERTIC PEPTIDE) - Abnormal; Notable for the following:     Beta Natriuretic Peptide 1,396 (*)     All other components within normal limits   CBC W/ DIFFERENTIAL - Abnormal; Notable for the foll I spent a total of 35 minutes of critical care time in obtaining history, performing a physical exam, bedside monitoring of interventions, collecting and interpreting tests and discussion with consultants but not including time spent performing procedures. ADMISSION DATE:       11/02/2017    HISTORY AND PHYSICAL EXAMINATION    REASON FOR ADMISSION:  Generalized weakness, urine tract infection, acute renal failure, and hypovolemia.     HISTORY OF PRESENT ILLNESS:  Patient is a 35-year-old  female with PAST MEDICAL HISTORY:  Patient had a history of severe valvular heart disease. She had chronic atrial fibrillation.   She had chronic diastolic heart failure, history of breast cancer status post mastectomies, severe aortic stenosis status post TAVR, trans HEENT:  Atraumatic. Oropharynx clear. Dry mucous membranes. Normal hard and soft palate. Eyes: Mildly icteric sclerae. Pupils equal, round, reactive. NECK:  Supple. No lymphadenopathy. LUNGS:  Clear to auscultation bilaterally.   HEART:  Loud syst 11/2/2017 2017 Given 5000 Units Subcutaneous (Left Upper Abdomen) Huber Rojo RN      ipratropium-albuterol (DUONEB) nebulizer solution 3 mL     Date Action Dose Route User    Discharged on 11/3/2017    11/2/2017 1940 Given 3 mL Nebulization Sha

## 2017-11-03 NOTE — PROGRESS NOTES
Orders received from Dr Florentino Quintanilla to apply BiPAP - Kris Henley @ bedside declined BiPAP treatment and requested comfort measures. NRBM applied for comfort. Dr Inga Edwards notified and orders received for IVP morphine;  2 mg morphine administered @ 0801.   Oscar Duenas

## 2017-11-03 NOTE — TELEPHONE ENCOUNTER
Pt's son Sony Carolina called to let Dr. Skylar Vargas know that Idris Gong passed away this morning at [facility]. The family wanted to Thank Dr. Skylar Vargas for the excellent care he gave their mother.

## 2017-11-03 NOTE — HISTORICAL OFFICE NOTE
Ashland Betsy  : 10/02/1921  ACCOUNT:  [id number]  [phone]  PCP: Dr. Joel Tavera     TODAY'S DATE: 10/22/2014  DICTATED BY:  Erick Mendoza M.D. ]    CHIEF COMPLAINT: [Followup of .  CAD, of native vessels, Followup of Benign Essential Hypertensio mastectomy. GI: soft, nontender. MS: adequate gait for exercise testing/testing. EXT: no clubbing or cyanosis. SKIN: no rashes, lesions, ulcers and left lower chest 2cm raised sebaceous intact cyst with pale pink edges.   NEURO/PSYCH: alert, oriented to ti 0.31MG/3  as needed                                06/01/11 Protonix              40MG      1 daily                                  06/02/10 Zocor                 20MG      1 daily at bedtime                       06/02/10 Multivitamins

## 2017-11-03 NOTE — PROGRESS NOTES
11/03/17 0728   Clinical Encounter Type   Visited With Family; Patient not available   Routine Visit Follow-up   Continue Visiting Yes   Crisis Visit Patient actively dying   Referral From Nurse   Referral To    Patient Spiritual Encounters   Spi

## 2017-11-06 NOTE — TELEPHONE ENCOUNTER
Son called and wanted to give Dr Ke Gavin and Dr Mike Restrepo pts [facility] info:  11/10/17  3-8 pm [address] [place] [address] home in [place]   To clinical

## 2018-09-02 NOTE — TELEPHONE ENCOUNTER
I did speak to patients other son Britany Hsu and he was notified that we either need to speak to patient to get more information about her symptoms or if he can bring patient in to see Abbey Becerra.  Britany Hsu said he was on his way home and he will have patient call complains of pain/discomfort

## 2023-03-06 NOTE — TELEPHONE ENCOUNTER
3/6/2023      Ruthann Card  [address] [facility] [address]      Dear Ruthann Card,                                                                                                       Jessica Dubois MD wrote a referral for you to see a Behavioral Health provider. Our attempt to reach you by phone has been unsuccessful.     Please call the Behavioral Health Central Scheduling Patient Line [phone] at your earliest convenience. Let the  know that a behavioral health referral has been ordered and you are calling to discuss the referral.    We look forward to assisting you with your health care needs.    Sincerely,    Behavioral Health Central Scheduling Department  [phone]       Please notify son that I think it is best if she goes to emergency room with that. This way they can monitor for a while. They can make sure she is not in atrial fibrillation or having any cardiac issues that could run into trouble.   Please have them rep

## (undated) NOTE — LETTER
Hospital Discharge Documentation  Pt was Discharged from [address] [place] [address] to [facility] 10[facility]26[facility]. No discharge summary available at this time, bellow is the most recent progress note  for your review .       From: [address] Hospitalist's Office  Phone: [phone] - hypotension imroved , decrease IVF   - monitor for fluid overload as patients CXR shows mild interstitial edema.    - continue home dose lasix  - hold lisinopril in the setting of hypotension.   - monitor hemodynamic status.      Afib wit RVR  - cards hav Result Date: 10/23/2017  CONCLUSION:   Mild interstitial edema.  Mild bibasilar airspace disease, favor atelectasis.       Ekg 12-lead     Result Date: 10/23/2017  ECG Report  Interpretation  -------------------------- Atrial fibrillation - Anteroseptal -la

## (undated) NOTE — LETTER
Hospital Discharge Documentation  This letter is to notify you that your patient Elida Barker  at [address] this admission.      From: [address] Hospitalist's Office  Phone: [phone]    Patient discharged time/date: 11/3/2017 11:15 AM  Guerline tests showed AST of 998, ALT of 186, total bilirubin of 1.5, alkaline phosphatase 68. The patient was admitted to the hospital for further management and comfort measures.   For detailed past medical history, past surgical history, medications at home; ple

## (undated) NOTE — IP AVS SNAPSHOT
[address]  [facility] ~ [phone]                Discharge Summary   1/3/2017    [facility]           Admission Information        Provider Department    1/3/2017 Kirsten Palacio MD [facility] 5s Notes to Patient:   To be taken overnight for restless leg syndrome        TAKE ONE TABLET BY MOUTH ONE TO THREE TIMES A DAY FOR RESTLESS LEG SYNDROME    Eloise Beverage                           dronedarone HCl 400 MG Tabs   Last time this was given:  400 mg Take 17 g by mouth daily as needed. Imagelynn Delgado                           Potassium Chloride ER 10 MEQ Tbcr   Last time this was given:  40 mEq on 1/8/2017 12:00 PM   Commonly known as:  K-DUR   Next dose due:  Next dose 1/10/17 in a.m.         Take 80.5 (01/09/17)  26.6 (L) (01/09/17)  33.1 -- (01/09/17)  203 (01/09/17)  8.1    (01/08/17)  9.6 (01/08/17)  3.07 (L) (01/08/17)  8.1 (L) (01/08/17)  24.5 (L) (01/08/17)  79.6 (L) (01/08/17)  26.5 (L) (01/08/17)  33.3  (01/08/17)  180 (01/08/17)  8.2    (0 harming yourself, contact [facility] at [phone]. - If you don’t have insurance, Leigh Ann Rice has partnered with Patient [address] to help you get signed up for insurance coverage.   Patient [place] Salicylates     Salicylates    BABY ASPIRIN 81 MG OR CHEW         Use:  “Thinning” of blood to prevent clotting within blood vessels, Pain relief   Most common side effects:  Bleeding, stomach upset   What to report to your healthcare team: Unusual bleedin General Nerve Function Medications     CARBIDOPA-LEVODOPA  MG Oral Tab       Use:  Treat conditions such as seizures, headaches, depression, anxiety and other neurologic conditions   Most common side effects:  Dizziness, drowsiness, headache, nausea/

## (undated) NOTE — MR AVS SNAPSHOT
[facility]  [address] [facility] [address]  [phone]               Thank you for choosing us for your health care visit with Hugo Shaw MD.  We are glad to serve you and happy to provide you with this summary of your visit.   Please BABY ASPIRIN 81 MG Chew   Generic drug:  aspirin   1 TABLET DAILY           carbidopa-levodopa  MG Tabs   TAKE ONE TABLET BY MOUTH ONE TO THREE TIMES A DAY FOR RESTLESS LEG SYNDROME   Commonly known as:  SINEMET           Cefadroxil 500 MG Caps   Ta including white bread, rice and pasta   Eat plenty of protein, keep the fat content low Sugars:  sodas and sports drinks, candies and desserts   Eat plenty of low-fat dairy products High fat meats and dairy   Choose whole grain products Foods high in sodiu

## (undated) NOTE — LETTER
Hospital Discharge Documentation  Please phone to schedule a hospital follow up appointment.     From: [address] Hospitalist's Office  Phone: [phone]    Patient discharged time/date: 1/9/2017  5:21 PM  Patient discharge disposition:  [facility] Shortness of breath     Atrial fibrillation with RVR (HCC)     Atrial fibrillation with rapid ventricular response (HCC)     Sinus bradycardia     Syncope and collapse     Elevated troponin     Hypotension, unspecified hypotension type     Heme positive BABY ASPIRIN 81 MG OR CHEW  1 TABLET DAILY    Fluticasone Propionate (FLONASE) 50 MCG/ACT Nasal Suspension  1 spray by Each Nare route daily.   Qty: 1 Bottle Refills: 3      STOP taking these medications    apixaban 2.5 MG Oral Tab          Follow up Visits -gentle ivf - now stopped  -given zosyn x 7 days    Chronic atrial fibrillation.    -on multaq and eliquis  -in nsr  - d/w with Dr. Virgene Hashimoto - given Anemia--> will stop eliquis risk out weighs benefits    Hx.  Of transcatheter aortic valve replacement with resi

## (undated) NOTE — MR AVS SNAPSHOT
[facility]  [address] [place] [address]  [phone]               Thank you for choosing us for your health care visit with Andrea Nayak MD.  We are glad to serve you and happy to provide you with this summary of your visit.   Please acetaminophen 325 MG Tabs   Take 650 mg by mouth every 6 (six) hours as needed for Pain. Commonly known as:  TYLENOL           Albuterol Sulfate  (90 Base) MCG/ACT Aers   Inhale 2 puffs into the lungs every 6 (six) hours as needed for Wheezing. ? Install grab bars on the bathroom walls beside the tub, shower and toilet. ? Use a non skid rubber mat in the tub/shower. ? If you are unsteady on your feet you may want to use a shower chair/bench and a hand held shower head while bathing/showering.

## (undated) NOTE — IP AVS SNAPSHOT
[facility]            (For Outpatient Use Only) Initial Admit Date: 10/23/2017   Inpt/Obs Admit Date: Inpt: 10/23/17 / Obs: N/A   Discharge Date:    Syed Dixon:  [de-identified]   MRN: [de-identified]   CSN: [id number]        ENCOUNTER  Patient Cla October 26, 2017

## (undated) NOTE — IP AVS SNAPSHOT
Patient Demographics     Address Phone E-mail Address    [address] [address] [phone] (Home)  [phone] (Mobile) [email or web address]      Emergency Contact(s)     Name Relation Home Work Samuel, [employer] Son   2 Last time this was given:  1 tablet on 1/9/2017 12:53 AM   Commonly known as:  SINEMET   Next dose due:  Next dose 1/9/17 at 9 p.m. Notes to Patient:   To be taken overnight for restless leg syndrome        TAKE ONE TABLET BY MOUTH ONE TO THREE TIMES A DA TAKE 1 TABLET (40 MG) BY ORAL ROUTE ONCE DAILY    Wickes Kolb                           PEG 3350 Pack   Commonly known as:  Cassie Ornelas   Next dose due:  As needed for constipation        Take 17 g by mouth daily as needed.    Stop taking on:  1/16/2017    ELIUD [id number] docusate sodium (COLACE) cap 100 mg 01/08/17 2106 Given      [id number] docusate sodium (COLACE) cap 100 mg 01/09/17 0931 Given      [id number] dronedarone HCl (MULTAQ) tab 400 mg 01/08/17 1742 Given      [id number] dronedarone HCl (MULTAQ) tab 400 Chronic Kidney Disease: GFR <60 ml/min/1.73 m2  Kidney failure: GFR <15 ml/min/1.73 m2    The accuracy of the MDRD equation is not suitable for acute renal failure patients and it is not recommended for use with pregnant women.             CBC WITH PAULINA Order Status:  Completed Lab Status:  Final result Updated:  01/08/17 2100    Specimen Information:  Blood from Blood,peripheral      Blood Culture Result No Growth 5 Days          H&P - H&P Note      H&P signed by Brittanie Beltran MD at 1/4/2017  6:29 PM Hemoccult stool. She was started on IV Zosyn, and she will be admitted to the hospital for further management and evaluation. B-natriuretic peptide is still pending.     PAST MEDICAL HISTORY:  The patient has a history of atrial fibrillation which is  Anicteric sclerae and moist conjunctivae. No lid lag. Pupils equal, round, and reactive. NECK:  Supple. No lymphadenopathy. LUNGS:  Chest clear to auscultation bilaterally.   HEART:  Irregularly irregular rhythm with loud systolic murmur best heard at Author:  Joshua Holly MD Service:  (none) Author Type:  Physician    Filed:  1/5/2017  6:50 PM Note Time:  1/5/2017  6:19 PM Status:  Signed    :  Joshua Holly MD (Physician) • Ischemic vascular disease 2006     small vessel ischemic vascular disease by MRI of brain   • History of asthma    • Gallstones    • Hepatic cyst    • TIA (transient ischemic attack) 2000   • Restless leg syndrome    • Hyperlipidemia    • Carotid stenosi OTHER SURGICAL HISTORY  2013    Comment heart valve replacement     Family History   Problem Relation Age of Onset   • Other[other] [OTHER] Mother      influenza- cause of death   • Other[other] [OTHER] Brother      passed away of \"mastoid\"   • Other[ot Review of Systems:    A comprehensive 10 point review of systems was completed. Pertinent positives and negatives noted in the the HPI.     Physical Exam:  Vital signs: Blood pressure 131/44, pulse 71, temperature 98.3 °F (36.8 °C), temperature source Oral 84/38 with WBC of 6 on admission increase to 21.9 next day. Started on Zosyn. Blood cultures no growth to date. Chest x-ray with small bilateral pleural effusions with central vascular congestion. CT head with no acute intracranial process.   CT of the ----      Transthoracic Echocardiography M-mode, complete 2D, complete spectral Doppler, and color Doppler ------------------------------------------------------------------------------- Kaylah Underwood                                     F 95 10/02/1 apical, and subcostal acoustic windows. Study completion:  The patient tolerated the procedure well. There were no complications. Transthoracic echocardiography. M-mode, complete 2D, complete spectral Doppler, and color Doppler.  Patient birthdate: [place] ---- 2D measurements                                               Normal Left ventricle LV internal dimension, ED, chordal level, PLAX *40.8 mm       43-52 LV internal dimension, ES, chordal level, PLAX  27.7 mm       23-38 Fractional shortening, chordal cm/s     ------- Peak A-wave velocity                            80.5 cm/s     ------- Deceleration time                               *317 ms       150-230 Peak gradient, D                                   8 mm Hg    ------- Peak E/A ratio • TIA (transient ischemic attack) 2000   • Restless leg syndrome    • Hyperlipidemia    • Carotid stenosis 2006   • Aortic stenosis    • Carotid artery disease (HCC)    • Hernia, abdominal      hernia repair   • Cholelithiasis    • Small bowel obstruction Patient’s self-stated goal is to return home.     OBJECTIVE       WEIGHT BEARING RESTRICTION  Weight Bearing Restriction: None                PAIN ASSESSMENT   Rating:  (c/o soreness low back)          BALANCE PT Discharge Recommendations:  (Per  note, plan for [facility])     PLAN  PT Treatment Plan: Transfer training;Gait training    CURRENT GOALS   Goal #1  Patient is able to demonstrate supine - sit EOB @ level: independent      Goal #2  Patient is able

## (undated) NOTE — IP AVS SNAPSHOT
Patient Demographics     Address  [address] [place] [address] Phone  [phone] [facility]) E-mail Address  [email or web address]      Emergency Contact(s)     Name Relation Home Work Inocente Ponce   [phone] Start taking on:  10/27/2017  Next dose due: Tomorrow 10/27/17      Take 1 tablet (25 mg total) by mouth Daily Beta Blocker. Raymond Hair MD         [facility] OR  Next dose due:  As needed      Take by mouth daily as needed.           Pantoprazole Sodium [id number] ondansetron HCl (ZOFRAN) injection 4 mg 10/26/17 0033 Given            RIGHT UPPER ABDOMEN     Order ID Medication Name Action Time Action Reason Comments    [id number] Heparin Sodium (Porcine) 5000 UNIT/ML injection 5,000 Units 10/25/17 2009 [employer]    Component Value Reference Range Flag Lab   Glucose 137 70 - 99 mg/dL [facility]   Sodium 138 136 - 144 mmol/L — [facility]   Potassium 3.9 3.3 - 5.1 mmol/L — [facility]   Chloride 112 95 - 110 mmol/L [facility]   CO2 19 22 - 3 The following orders were created for panel order CBC WITH DIFFERENTIAL WITH PLATELET.   Procedure                               Abnormality         Status                     ---------                               -----------         ------ which is below her baseline. CBC was unremarkable. Hemoglobin 9.9, which is around her baseline. The patient was noted to be in atrial fibrillation, which is chronic. Urinalysis still pending.   She will be admitted to the hospital for further managemen HEENT:  Atraumatic. Oropharynx clear. Dry mucous membranes. Eyes:  Anicteric sclerae. Pupils equal, round, reactive. NECK:  Supple. No lymphadenopathy. Trachea midline. LUNGS:  Clear to auscultation bilaterally. Normal respiratory effort.   No inte Location [facility] 4W/SW/SE Attending Pretty Rose MD   [facility] Day # 2 PCP Zulay Saldivar MD     Date of Admission:  10/23/2017  Date of Consult: October 25, 2017    Reason for Consultation: Atrial fibrillation with rapid ventricular response • Congestive heart failure (CHF) ([facility] [address]    • Gallstones    • Gastric volvulus 2006    repaired by Dr Madelin Layne   • Hepatic cyst    • Hernia, abdominal     hernia repair   • History of asthma    • Hyperlipidemia    • Hypertension    • Hypotropia 2008   • Iri passed away of \"mastoid\"   • Priscila Montenegro Sister 15      [place]   • Diabetes Neg    • Glaucoma Neg    • Macular degeneration Neg      Social History:  Smoking status: Never Smoker periods of feeling anxious no chest pains chronic COPD breathing's been stable no PND no orthopnea no lower extremity edema no fevers chills or cough no GI or urinary symptoms otherwise negative  Physical Exam:   Vital Signs:   height is 4' 11\" (1.499 m) with ECG of 01/03/2017 17:13:40 no change Electronically signed on 10/24/2017 at 13:22 by Cornelio Moreno MD      Impression:   Dehydration/patient has been treated weakness generalized  With IV hydration blood pressure has improved  With mild crackles on exa TECHNIQUE:   Two views. FINDINGS:   CARDIAC/MEDIASTINUM:   The cardiac silhouette is enlarged and unchanged. There is atherosclerotic calcification of the thoracic aorta. Post cardiac valvular replacement. There are mitral annular calcifications.   LUNGS: Pt was see BID today for therapy activity. Pt req some encouragement to participate with therapy activity. Pt CGA for bed mobility and transfers with RW, Pt needs cues for proper hand placement. Pt amb with RW with slow and small bonnie, steady gait.  Pt i FUNCTIONAL ABILITY STATUS  Gait Assessment[DK. 1]   Gait Assistance: Minimum assistance  Distance (ft): 100  Assistive Device: Rolling walker  Pattern: Shuffle  Stoop/Curb Assistance: Not tested[DK. 2]       Additional information:     THERAPEUTIC EXERCISES Presenting Problem: dehydration, weight loss, poor appetite, weakness[SD. 2]  Reason for Therapy: Mobility Dysfunction and Discharge Planning    PHYSICAL THERAPY ASSESSMENT     Patient is a[SD.3 80year old[SD.2] female admitted 10/23/2017 for weakness, de rehab. Hx of CHF, afib, COPD and restless leg. She reports she fell once at home one year ago when trying to get out of bed.      Problem List[SD.1]  Principal Problem:    Dehydration  Active Problems:    Weakness generalized    Hypotension[SD.2]      Past 2002, 2008: CATARACT EXTRACTION W/  INTRAOCULAR LENS Keren Mattmitra*  No date: CATH PERCUTANEOUS  TRANSLUMINAL CORONARY ANGIO*      Comment: [employer] and [employer] of the left main  2013: CATH TRANSCATHETER AORTIC VALVE REPLACEMENT  2/1/12: ELECTROCARDIOGRAM, COMPLETE      C Room air. 02 97% at rest, 94 with activity. HR  at rest. Hx of afib BMGUY.[FF.2]   AM-PAC '6-Clicks' INPATIENT SHORT FORM - BASIC MOBILITY  How much difficulty does the patient currently have. ..[SD.1]  -   Turning over in bed (including adjusting bed SBA[SD. 2]   Goal #3   Current Status    Goal #4 Patient will negotiate[SD.1] 5[SD.2] stairs/one curb[SD. 1] w/1 rail if going home.[SD.2]    Goal #4   Current Status    Goal #5 Patient to demonstrate independence with home activity/exercise instructions pro OT Treatment Plan: ADL training;Functional transfer training; Endurance training      SUBJECTIVE    Pt seen in bathroom and agreeable for OT session.     OBJECTIVE  Precautions: None    WEIGHT BEARING RESTRICTION  Weight Bearing Restriction: None Patient will complete functional transfer with mod I  Comment: supervision for sit to stand    Patient will complete toileting with I  Comment: min assist    Patient will tolerate standing for 7 minutes in prep for adls with SBA  Comment:pt tolerated stand OT Treatment Plan: ADL training;UE strengthening/ROM; Endurance training;Patient/Family training         OCCUPATIONAL THERAPY MEDICAL/SOCIAL HISTORY     Problem List  Principal Problem:    Dehydration  Active Problems:    Weakness generalized    Hypotension -   Putting on and taking off regular upper body clothing?: A Little  -   Taking care of personal grooming such as brushing teeth?: A Little  -   Eating meals?: A Little    AM-PAC Score:  Score: 18  Approx Degree of Impairment: 46.65%  Standardized Score ( Pt seen for swallowing therapy to monitor swallowing tolerance and train swallowing precautions. Pt seen with limited trials of 1 bite of soft solid and 2 sips of water from an open cup. Pt refused all other trials.   Pt repositioned upright in bed for po pt's dentures. In Progress   Goal #2 The patient/family/caregiver will demonstrate understanding and implementation of aspiration precautions and swallow strategies independently over 2 session(s). Educated the pt on swallowing precautions.   Pt acknowl